# Patient Record
Sex: MALE | Race: BLACK OR AFRICAN AMERICAN | NOT HISPANIC OR LATINO | Employment: FULL TIME | URBAN - METROPOLITAN AREA
[De-identification: names, ages, dates, MRNs, and addresses within clinical notes are randomized per-mention and may not be internally consistent; named-entity substitution may affect disease eponyms.]

---

## 2018-09-04 ENCOUNTER — TRANSCRIBE ORDERS (OUTPATIENT)
Dept: ADMINISTRATIVE | Facility: HOSPITAL | Age: 38
End: 2018-09-04

## 2018-09-04 ENCOUNTER — HOSPITAL ENCOUNTER (OUTPATIENT)
Dept: RADIOLOGY | Facility: HOSPITAL | Age: 38
Discharge: HOME | End: 2018-09-04
Payer: COMMERCIAL

## 2018-09-04 DIAGNOSIS — R05.9 COUGH: Primary | ICD-10-CM

## 2018-09-04 DIAGNOSIS — R06.2 WHEEZING: ICD-10-CM

## 2018-09-04 DIAGNOSIS — R91.8 LUNG MASS: ICD-10-CM

## 2018-09-04 DIAGNOSIS — R05.9 COUGH: ICD-10-CM

## 2018-09-04 DIAGNOSIS — D84.9 IMMUNOCOMPROMISED (HCC): ICD-10-CM

## 2018-09-04 PROCEDURE — 71046 X-RAY EXAM CHEST 2 VIEWS: CPT

## 2018-10-04 ENCOUNTER — HOSPITAL ENCOUNTER (EMERGENCY)
Facility: HOSPITAL | Age: 38
Discharge: HOME | End: 2018-10-04
Attending: EMERGENCY MEDICINE | Admitting: EMERGENCY MEDICINE
Payer: COMMERCIAL

## 2018-10-04 VITALS
HEART RATE: 99 BPM | SYSTOLIC BLOOD PRESSURE: 128 MMHG | WEIGHT: 286 LBS | TEMPERATURE: 97.5 F | RESPIRATION RATE: 20 BRPM | OXYGEN SATURATION: 95 % | DIASTOLIC BLOOD PRESSURE: 88 MMHG

## 2018-10-04 DIAGNOSIS — I10 HYPERTENSION, UNSPECIFIED TYPE: ICD-10-CM

## 2018-10-04 DIAGNOSIS — J02.9 PHARYNGITIS, UNSPECIFIED ETIOLOGY: Primary | ICD-10-CM

## 2018-10-04 LAB — S PYO AG THROAT QL: NEGATIVE

## 2018-10-04 PROCEDURE — 87430 STREP A AG IA: CPT | Performed by: NURSE PRACTITIONER

## 2018-10-04 PROCEDURE — 99283 EMERGENCY DEPT VISIT LOW MDM: CPT

## 2018-10-04 PROCEDURE — 96372 THER/PROPH/DIAG INJ SC/IM: CPT

## 2018-10-04 RX ORDER — AMOXICILLIN AND CLAVULANATE POTASSIUM 875; 125 MG/1; MG/1
1 TABLET, FILM COATED ORAL EVERY 12 HOURS
Qty: 20 TABLET | Refills: 0 | Status: SHIPPED | OUTPATIENT
Start: 2018-10-04 | End: 2018-10-14

## 2018-10-04 RX ORDER — KETOROLAC TROMETHAMINE 30 MG/ML
15 INJECTION, SOLUTION INTRAMUSCULAR; INTRAVENOUS ONCE
Status: COMPLETED | OUTPATIENT
Start: 2018-10-04 | End: 2018-10-04

## 2018-10-04 RX ORDER — KETOROLAC TROMETHAMINE 10 MG/1
10 TABLET, FILM COATED ORAL EVERY 6 HOURS PRN
Qty: 20 TABLET | Refills: 0 | Status: SHIPPED | OUTPATIENT
Start: 2018-10-04 | End: 2018-12-20

## 2018-10-04 RX ORDER — KETOROLAC TROMETHAMINE 30 MG/ML
INJECTION, SOLUTION INTRAMUSCULAR; INTRAVENOUS
Status: COMPLETED
Start: 2018-10-04 | End: 2018-10-04

## 2018-10-04 RX ORDER — ACETAMINOPHEN 325 MG/1
TABLET ORAL
Status: COMPLETED
Start: 2018-10-04 | End: 2018-10-04

## 2018-10-04 RX ORDER — ACETAMINOPHEN 325 MG/1
650 TABLET ORAL ONCE
Status: COMPLETED | OUTPATIENT
Start: 2018-10-04 | End: 2018-10-04

## 2018-10-04 RX ADMIN — ACETAMINOPHEN 650 MG: 325 TABLET ORAL at 17:49

## 2018-10-04 RX ADMIN — KETOROLAC TROMETHAMINE 15 MG: 30 INJECTION, SOLUTION INTRAMUSCULAR; INTRAVENOUS at 17:50

## 2018-10-04 NOTE — DISCHARGE INSTRUCTIONS
Pharyngitis   WHAT YOU NEED TO KNOW:   Pharyngitis, or sore throat, is inflammation of the tissues and structures in your pharynx (throat)  Pharyngitis is most often caused by bacteria  It may also be caused by a cold or flu virus  Other causes include smoking, allergies, or acid reflux  DISCHARGE INSTRUCTIONS:   Call 911 for any of the following:   · You have trouble breathing or swallowing because your throat is swollen or sore  Return to the emergency department if:   · You are drooling because it hurts too much to swallow  · Your fever is higher than 102? F (39?C) or lasts longer than 3 days  · You are confused  · You taste blood in your throat  Contact your healthcare provider if:   · Your throat pain gets worse  · You have a painful lump in your throat that does not go away after 5 days  · Your symptoms do not improve after 5 days  · You have questions or concerns about your condition or care  Medicines:  Viral pharyngitis will go away on its own without treatment  Your sore throat should start to feel better in 3 to 5 days for both viral and bacterial infections  You may need any of the following:  · Antibiotics  treat a bacterial infection  · NSAIDs , such as ibuprofen, help decrease swelling, pain, and fever  NSAIDs can cause stomach bleeding or kidney problems in certain people  If you take blood thinner medicine, always ask your healthcare provider if NSAIDs are safe for you  Always read the medicine label and follow directions  · Acetaminophen  decreases pain and fever  It is available without a doctor's order  Ask how much to take and how often to take it  Follow directions  Acetaminophen can cause liver damage if not taken correctly  · Take your medicine as directed  Contact your healthcare provider if you think your medicine is not helping or if you have side effects  Tell him or her if you are allergic to any medicine   Keep a list of the medicines, vitamins, and herbs you take  Include the amounts, and when and why you take them  Bring the list or the pill bottles to follow-up visits  Carry your medicine list with you in case of an emergency  Manage your symptoms:   · Gargle salt water  Mix ¼ teaspoon salt in an 8 ounce glass of warm water and gargle  This may help decrease swelling in your throat  · Drink liquids as directed  You may need to drink more liquids than usual  Liquids may help soothe your throat and prevent dehydration  Ask how much liquid to drink each day and which liquids are best for you  · Use a cool-steam humidifier  to help moisten the air in your room and calm your cough  · Soothe your throat  with cough drops, ice, soft foods, or popsicles  Prevent the spread of pharyngitis:  Cover your mouth and nose when you cough or sneeze  Do not share food or drinks  Wash your hands often  Use soap and water  If soap and water are unavailable, use an alcohol based hand   Follow up with your healthcare provider as directed:  Write down your questions so you remember to ask them during your visits  © 2017 [facility] Information is for End User's use only and may not be sold, redistributed or otherwise used for commercial purposes  All illustrations and images included in CareNotes® are the copyrighted property of A D A M , Inc  or Pablo Bullard  The above information is an  only  It is not intended as medical advice for individual conditions or treatments  Talk to your doctor, nurse or pharmacist before following any medical regimen to see if it is safe and effective for you  Hypertension   WHAT YOU NEED TO KNOW:   Hypertension is high blood pressure (BP)  Your BP is the force of your blood moving against the walls of your arteries  Normal BP is less than 120/80  Prehypertension is between 120/80 and 139/89  Hypertension is 140/90 or higher   Hypertension causes your BP to get so high that your heart has to work much harder than normal  This can damage your heart  You can control hypertension with a healthy lifestyle or medicines  A controlled blood pressure helps protect your organs, such as your heart, lungs, brain, and kidneys  DISCHARGE INSTRUCTIONS:   Call 911 for any of the following:   · You have discomfort in your chest that feels like squeezing, pressure, fullness, or pain  · You become confused or have difficulty speaking  · You suddenly feel lightheaded or have trouble breathing  · You have pain or discomfort in your back, neck, jaw, stomach, or arm  Return to the emergency department if:   · You have a severe headache or vision loss  · You have weakness in an arm or leg  Contact your healthcare provider if:   · You feel faint, dizzy, confused, or drowsy  · You have been taking your BP medicine and your BP is still higher than your healthcare provider says it should be  · You have questions or concerns about your condition or care  Medicines: You may  need any of the following:  · Medicine  may be used to help lower your BP  You may need more than one type of medicine  Take the medicine exactly as directed  · Diuretics  help decrease extra fluid that collects in your body  This will help lower your BP  You may urinate more often while you take this medicine  · Cholesterol medicine  helps lower your cholesterol level  A low cholesterol level helps prevent heart disease and makes it easier to control your blood pressure  · Take your medicine as directed  Contact your healthcare provider if you think your medicine is not helping or if you have side effects  Tell him or her if you are allergic to any medicine  Keep a list of the medicines, vitamins, and herbs you take  Include the amounts, and when and why you take them  Bring the list or the pill bottles to follow-up visits  Carry your medicine list with you in case of an emergency    Follow up with your healthcare provider as directed: You will need to return to have your BP checked and to have other lab tests done  Write down your questions so you remember to ask them during your visits  Manage hypertension:  Talk with your healthcare provider about these and other ways to manage hypertension:  · Check your BP at home  Sit and rest for 5 minutes before you take your BP  Extend your arm and support it on a flat surface  Your arm should be at the same level as your heart  Follow the directions that came with your BP monitor  If possible, take at least 2 BP readings each time  Take your BP at least twice a day at the same times each day, such as morning and evening  Keep a record of your BP readings and bring it to your follow-up visits  Ask your healthcare provider what your BP should be  · Limit sodium (salt) as directed  Too much sodium can affect your fluid balance  Check labels to find low-sodium or no-salt-added foods  Some low-sodium foods use potassium salts for flavor  Too much potassium can also cause health problems  Your healthcare provider will tell you how much sodium and potassium are safe for you to have in a day  He or she may recommend that you limit sodium to 2,300 mg a day  · Follow the meal plan recommended by your healthcare provider  A dietitian or your provider can give you more information on low-sodium plans or the DASH (Dietary Approaches to Stop Hypertension) eating plan  The DASH plan is low in sodium, unhealthy fats, and total fat  It is high in potassium, calcium, and fiber  · Exercise to maintain a healthy weight  Exercise at least 30 minutes per day, on most days of the week  This will help decrease your blood pressure  Ask your healthcare provider about the best exercise plan for you  · Decrease stress  This may help lower your BP  Learn ways to relax, such as deep breathing or listening to music  · Limit alcohol    Women should limit alcohol to 1 drink a day  Men should limit alcohol to 2 drinks a day  A drink of alcohol is 12 ounces of beer, 5 ounces of wine, or 1½ ounces of liquor  · Do not smoke  Nicotine and other chemicals in cigarettes and cigars can increase your BP and also cause lung damage  Ask your healthcare provider for information if you currently smoke and need help to quit  E-cigarettes or smokeless tobacco still contain nicotine  Talk to your healthcare provider before you use these products  · Manage any other health conditions you have  Health conditions such as diabetes can increase your risk for hypertension  Follow your healthcare provider's instructions and take all your medicines as directed  © 2017 [facility] [facility] Information is for End User's use only and may not be sold, redistributed or otherwise used for commercial purposes  All illustrations and images included in CareNotes® are the copyrighted property of A D A [facility] [employer]  or Pablo Bullard  The above information is an  only  It is not intended as medical advice for individual conditions or treatments  Talk to your doctor, nurse or pharmacist before following any medical regimen to see if it is safe and effective for you

## 2018-10-04 NOTE — ED PROVIDER NOTES
History  Chief Complaint   Patient presents with    Jaw Pain     past 3 days have pain under right neck/jaw     Patient is a 26-year-old male presenting to the emergency department reporting right-sided neck and throat pain that he feels been getting worse over the past 2 days  He reports taking ibuprofen 400 mg with no relief of his symptoms  He reports some pain with swallowing but no difficulty in swallowing  No difficulty speaking  He denies any trouble breathing  Denies any chest pain or tightness  Denies any abdominal pain, nausea, vomiting, or diarrhea  He reports no fevers or chills  Otherwise, reporting no other symptoms  Denies any other concerns  None       Past Medical History:   Diagnosis Date    Asthma        History reviewed  No pertinent surgical history  History reviewed  No pertinent family history  I have reviewed and agree with the history as documented  Social History   Substance Use Topics    Smoking status: Current Every Day Smoker    Smokeless tobacco: Never Used    Alcohol use No        Review of Systems   Constitutional: Negative for chills, fatigue and fever  HENT: Positive for sore throat  Negative for congestion, ear pain, facial swelling, postnasal drip, rhinorrhea, sinus pain, sinus pressure, trouble swallowing (  Pain with swallowing ) and voice change  Eyes: Negative for pain, discharge and redness  Respiratory: Negative for chest tightness, shortness of breath and stridor  Cardiovascular: Negative for chest pain and palpitations  Gastrointestinal: Negative for abdominal pain, diarrhea, nausea and vomiting  Musculoskeletal: Positive for neck pain (Right anterior  )  Negative for arthralgias, back pain, joint swelling and neck stiffness  Skin: Negative for rash and wound  Neurological: Negative for dizziness, light-headedness and headaches  Hematological: Negative for adenopathy         Physical Exam  Physical Exam   Constitutional: He is oriented to person, place, and time  He appears well-developed and well-nourished  Non-toxic appearance  He does not have a sickly appearance  He does not appear ill  No distress  HENT:   Right Ear: Tympanic membrane normal    Left Ear: Tympanic membrane normal    Nose: Nose normal    Mouth/Throat: Uvula is midline  Mucous membranes are not pale, dry and not cyanotic  No trismus in the jaw  No uvula swelling  Posterior oropharyngeal erythema present  No oropharyngeal exudate, posterior oropharyngeal edema or tonsillar abscesses  Tonsils are 2+ on the right  Tonsils are 1+ on the left  No tonsillar exudate  Mildly erythematous canals  No swelling is appreciated  TMs mildly injected, otherwise normal in appearance  Patient reports no ear pain or discomfort  Eyes: Conjunctivae are normal    Neck: Neck supple  Cardiovascular: Normal rate and regular rhythm  Pulmonary/Chest: Effort normal and breath sounds normal  No respiratory distress  Abdominal: There is no tenderness  Neurological: He is alert and oriented to person, place, and time  Skin: Skin is warm and dry  Psychiatric: He has a normal mood and affect  Nursing note and vitals reviewed        Vital Signs  ED Triage Vitals [10/04/18 1708]   Temperature Pulse Respirations Blood Pressure SpO2   97 5 °F (36 4 °C) (!) 107 20 (!) 169/117 96 %      Temp Source Heart Rate Source Patient Position - Orthostatic VS BP Location FiO2 (%)   Tymp Core Monitor Sitting Left arm --      Pain Score       9           Vitals:    10/04/18 1708   BP: (!) 169/117   Pulse: (!) 107   Patient Position - Orthostatic VS: Sitting       Visual Acuity      ED Medications  Medications   ketorolac (TORADOL) injection 15 mg (15 mg Intramuscular Given 10/4/18 1750)   acetaminophen (TYLENOL) tablet 650 mg (650 mg Oral Given 10/4/18 1749)       Diagnostic Studies  Results Reviewed     Procedure Component Value Units Date/Time    Rapid Strep A Screen Only, Adults [[id number]]  (Normal) Collected:  10/04/18 1749    Lab Status:  Final result Specimen:  Throat from Throat Updated:  10/04/18 1807     Rapid Strep A Screen Negative                 No orders to display              Procedures  Procedures       Phone Contacts  ED Phone Contact    ED Course                               MDM  Number of Diagnoses or Management Options  Hypertension, unspecified type:   Pharyngitis, unspecified etiology: new and requires workup  Diagnosis management comments: Strep test is negative  Patient will be treated prophylactically with Augmentin b i d  times 10 days due to his feeling of throat swelling and throat pain  Patient also given Toradol p o  to take as needed for pain  He was also advised to follow up with his primary provider for recheck and also for evaluation of hypertension  Patient is denying any complaint of chest pain, headache, dizziness or lightheadedness, abdominal pain, nausea vomiting or diarrhea  Patient stable for discharge at this time  Advised to return to the ED with any worsening symptoms or emergent concerns  Amount and/or Complexity of Data Reviewed  Clinical lab tests: reviewed and ordered    Patient Progress  Patient progress: stable    CritCare Time    Disposition  Final diagnoses:   Pharyngitis, unspecified etiology   Hypertension, unspecified type     Time reflects when diagnosis was documented in both MDM as applicable and the Disposition within this note     Time User Action Codes Description Comment    10/4/2018  6:39 PM Jess Decker [J02 9] Pharyngitis, unspecified etiology     10/4/2018  6:39 PM Jess Decker [I10] Hypertension, unspecified type       ED Disposition     ED Disposition Condition Comment    Discharge  [place] discharge to home/self care      Condition at discharge: Stable        Follow-up Information     Follow up With Specialties Details Why Contact Louisa Gordon  In 3 days To recheck year sore throat and also to be evaluated for high blood pressure, as discussed  [address] [facility] [address] [address] [facility] Emergency Department Emergency Medicine  As needed, If symptoms worsen [address] [address] [phone]          Patient's Medications   Discharge Prescriptions    AMOXICILLIN-CLAVULANATE (AUGMENTIN) 875-125 MG PER TABLET    Take 1 tablet by mouth every 12 (twelve) hours for 10 days       Start Date: 10/4/2018 End Date: 10/14/2018       Order Dose: 1 tablet       Quantity: 20 tablet    Refills: 0    KETOROLAC (TORADOL) 10 MG TABLET    Take 1 tablet (10 mg total) by mouth every 6 (six) hours as needed for moderate pain for up to 5 days       Start Date: 10/4/2018 End Date: 10/9/2018       Order Dose: 10 mg       Quantity: 20 tablet    Refills: 0     No discharge procedures on file      ED Provider  Electronically Signed by           MUNDO Garcia  10/04/18 [address]  [facility]  10/04/18 6319

## 2018-12-20 ENCOUNTER — HOSPITAL ENCOUNTER (EMERGENCY)
Facility: HOSPITAL | Age: 38
Discharge: HOME | End: 2018-12-20
Attending: EMERGENCY MEDICINE | Admitting: EMERGENCY MEDICINE
Payer: COMMERCIAL

## 2018-12-20 ENCOUNTER — APPOINTMENT (EMERGENCY)
Dept: RADIOLOGY | Facility: HOSPITAL | Age: 38
End: 2018-12-20
Payer: COMMERCIAL

## 2018-12-20 VITALS
TEMPERATURE: 98.1 F | HEART RATE: 99 BPM | OXYGEN SATURATION: 96 % | SYSTOLIC BLOOD PRESSURE: 132 MMHG | DIASTOLIC BLOOD PRESSURE: 72 MMHG | RESPIRATION RATE: 20 BRPM | WEIGHT: 257.28 LBS

## 2018-12-20 DIAGNOSIS — J45.901 ASTHMA EXACERBATION: Primary | ICD-10-CM

## 2018-12-20 LAB
ANION GAP SERPL CALCULATED.3IONS-SCNC: 8 MMOL/L (ref 4–13)
BASOPHILS # BLD AUTO: 0.04 THOUSANDS/ΜL (ref 0–0.1)
BASOPHILS NFR BLD AUTO: 1 % (ref 0–1)
BUN SERPL-MCNC: 10 MG/DL (ref 5–25)
CALCIUM SERPL-MCNC: 9.3 MG/DL (ref 8.3–10.1)
CHLORIDE SERPL-SCNC: 104 MMOL/L (ref 100–108)
CO2 SERPL-SCNC: 26 MMOL/L (ref 21–32)
CREAT SERPL-MCNC: 1.17 MG/DL (ref 0.6–1.3)
EOSINOPHIL # BLD AUTO: 0.33 THOUSAND/ΜL (ref 0–0.61)
EOSINOPHIL NFR BLD AUTO: 5 % (ref 0–6)
ERYTHROCYTE [DISTWIDTH] IN BLOOD BY AUTOMATED COUNT: 14.8 % (ref 11.6–15.1)
GFR SERPL CREATININE-BSD FRML MDRD: 90 ML/MIN/1.73SQ M
GLUCOSE SERPL-MCNC: 104 MG/DL (ref 65–140)
HCT VFR BLD AUTO: 49.8 % (ref 36.5–49.3)
HGB BLD-MCNC: 15.7 G/DL (ref 12–17)
IMM GRANULOCYTES # BLD AUTO: 0.02 THOUSAND/UL (ref 0–0.2)
IMM GRANULOCYTES NFR BLD AUTO: 0 % (ref 0–2)
LYMPHOCYTES # BLD AUTO: 1.2 THOUSANDS/ΜL (ref 0.6–4.47)
LYMPHOCYTES NFR BLD AUTO: 18 % (ref 14–44)
MCH RBC QN AUTO: 26.9 PG (ref 26.8–34.3)
MCHC RBC AUTO-ENTMCNC: 31.5 G/DL (ref 31.4–37.4)
MCV RBC AUTO: 85 FL (ref 82–98)
MONOCYTES # BLD AUTO: 0.72 THOUSAND/ΜL (ref 0.17–1.22)
MONOCYTES NFR BLD AUTO: 11 % (ref 4–12)
NEUTROPHILS # BLD AUTO: 4.21 THOUSANDS/ΜL (ref 1.85–7.62)
NEUTS SEG NFR BLD AUTO: 65 % (ref 43–75)
NRBC BLD AUTO-RTO: 0 /100 WBCS
PLATELET # BLD AUTO: 262 THOUSANDS/UL (ref 149–390)
PMV BLD AUTO: 9 FL (ref 8.9–12.7)
POTASSIUM SERPL-SCNC: 4.1 MMOL/L (ref 3.5–5.3)
RBC # BLD AUTO: 5.83 MILLION/UL (ref 3.88–5.62)
SODIUM SERPL-SCNC: 138 MMOL/L (ref 136–145)
WBC # BLD AUTO: 6.52 THOUSAND/UL (ref 4.31–10.16)

## 2018-12-20 PROCEDURE — 71046 X-RAY EXAM CHEST 2 VIEWS: CPT

## 2018-12-20 PROCEDURE — 36415 COLL VENOUS BLD VENIPUNCTURE: CPT | Performed by: FAMILY MEDICINE

## 2018-12-20 PROCEDURE — 99285 EMERGENCY DEPT VISIT HI MDM: CPT

## 2018-12-20 PROCEDURE — 85025 COMPLETE CBC W/AUTO DIFF WBC: CPT | Performed by: FAMILY MEDICINE

## 2018-12-20 PROCEDURE — 80048 BASIC METABOLIC PNL TOTAL CA: CPT | Performed by: FAMILY MEDICINE

## 2018-12-20 PROCEDURE — 96374 THER/PROPH/DIAG INJ IV PUSH: CPT

## 2018-12-20 PROCEDURE — 94640 AIRWAY INHALATION TREATMENT: CPT

## 2018-12-20 RX ORDER — PREDNISONE 20 MG/1
60 TABLET ORAL DAILY
Qty: 15 TABLET | Refills: 0 | Status: SHIPPED | OUTPATIENT
Start: 2018-12-20 | End: 2018-12-24 | Stop reason: HOSPADM

## 2018-12-20 RX ORDER — IPRATROPIUM BROMIDE AND ALBUTEROL SULFATE 2.5; .5 MG/3ML; MG/3ML
3 SOLUTION RESPIRATORY (INHALATION) ONCE
Status: COMPLETED | OUTPATIENT
Start: 2018-12-20 | End: 2018-12-20

## 2018-12-20 RX ORDER — SODIUM CHLORIDE FOR INHALATION 0.9 %
12 VIAL, NEBULIZER (ML) INHALATION ONCE
Status: COMPLETED | OUTPATIENT
Start: 2018-12-20 | End: 2018-12-20

## 2018-12-20 RX ORDER — METHYLPREDNISOLONE SODIUM SUCCINATE 125 MG/2ML
125 INJECTION, POWDER, LYOPHILIZED, FOR SOLUTION INTRAMUSCULAR; INTRAVENOUS ONCE
Status: COMPLETED | OUTPATIENT
Start: 2018-12-20 | End: 2018-12-20

## 2018-12-20 RX ORDER — ALBUTEROL SULFATE 90 UG/1
2 AEROSOL, METERED RESPIRATORY (INHALATION) EVERY 4 HOURS PRN
Qty: 8.5 G | Refills: 0 | Status: SHIPPED | OUTPATIENT
Start: 2018-12-20

## 2018-12-20 RX ADMIN — ISODIUM CHLORIDE 12 ML: 0.03 SOLUTION RESPIRATORY (INHALATION) at 11:58

## 2018-12-20 RX ADMIN — ALBUTEROL SULFATE 10 MG: 2.5 SOLUTION RESPIRATORY (INHALATION) at 11:57

## 2018-12-20 RX ADMIN — METHYLPREDNISOLONE SODIUM SUCCINATE 125 MG: 125 INJECTION, POWDER, FOR SOLUTION INTRAMUSCULAR; INTRAVENOUS at 10:03

## 2018-12-20 RX ADMIN — IPRATROPIUM BROMIDE AND ALBUTEROL SULFATE 3 ML: 2.5; .5 SOLUTION RESPIRATORY (INHALATION) at 10:03

## 2018-12-20 RX ADMIN — IPRATROPIUM BROMIDE 1 MG: 0.5 SOLUTION RESPIRATORY (INHALATION) at 11:58

## 2018-12-20 NOTE — DISCHARGE INSTRUCTIONS
Asthma   WHAT YOU NEED TO KNOW:   Asthma is a lung disease that makes breathing difficult  Chronic inflammation and reactions to triggers narrow the airways in the lungs  Asthma can become life-threatening if it is not managed  DISCHARGE INSTRUCTIONS:   Seek care immediately if:   · You have severe shortness of breath  · Your lips or nails turn blue or gray  · The skin around your neck and ribs pulls in with each breath  · You have shortness of breath, even after you take your short-term medicine as directed  · Your peak flow numbers are in the red zone of your AAP  Contact your healthcare provider if:   · You run out of medicine before your next refill is due  · Your symptoms get worse  · You need to take more medicine than usual to control your symptoms  · You have questions or concerns about your condition or care  Medicines:   · Medicines  decrease inflammation, open airways, and make it easier to breathe  Medicines may be inhaled, taken as a pill, or injected  Short-term medicines relieve your symptoms quickly  Long-term medicines are used to prevent future attacks  You may also need medicine to help control your allergies  Ask your healthcare provider for more information about the medicine you are given and how to take it safely  · Take your medicine as directed  Contact your healthcare provider if you think your medicine is not helping or if you have side effects  Tell him or her if you are allergic to any medicine  Keep a list of the medicines, vitamins, and herbs you take  Include the amounts, and when and why you take them  Bring the list or the pill bottles to follow-up visits  Carry your medicine list with you in case of an emergency  Follow up with your healthcare provider as directed: You will need to return to make sure your medicine is working and your symptoms are controlled   You may be referred to an asthma specialist  Kayleigh Rao may be asked to keep a record of your peak flow values and bring it with you to your appointments  Write down your questions so you remember to ask them  Manage your symptoms and prevent future attacks:   · Follow your Asthma Action Plan (AAP)  This is a written plan that you and your healthcare provider create  It explains which medicine you need and when to change doses if necessary  It also explains how you can monitor symptoms and use a peak flow meter  The meter measures how well your lungs are working  · Manage other health conditions , such as allergies, acid reflux, and sleep apnea  · Identify and avoid triggers  These may include pets, dust mites, mold, and cockroaches  · Do not smoke or be around others who smoke  Nicotine and other chemicals in cigarettes and cigars can cause lung damage  Ask your healthcare provider for information if you currently smoke and need help to quit  E-cigarettes or smokeless tobacco still contain nicotine  Talk to your healthcare provider before you use these products  · Ask about the flu vaccine  The flu can make your asthma worse  You may need a yearly flu shot  © 2017 [facility] [facility] Information is for End User's use only and may not be sold, redistributed or otherwise used for commercial purposes  All illustrations and images included in CareNotes® are the copyrighted property of [employer] Inc  or Pablo Bullard  The above information is an  only  It is not intended as medical advice for individual conditions or treatments  Talk to your doctor, nurse or pharmacist before following any medical regimen to see if it is safe and effective for you

## 2018-12-20 NOTE — ED PROVIDER NOTES
History  Chief Complaint   Patient presents with    Shortness of Breath     sob, wheezing, congestion, cough, sore throat x 2 days  Hx of asthma  Using inhaler, last use 2 hours ago  HPI  53-year-old here with shortness of breath  Patient has a past medical history of HIV compliant with his HIV meds and asthma on albuterol inhaler p r n  and Qvar   He reported 2 day history of runny nose, cough, sore throat  He reports he woke up at night feeling short of breath  He used his albuterol inhaler 3 times overnight with no improvement  He reports chills and nausea  He denies fever, vomiting, diarrhea, abdominal pain and ill contact  Prior to Admission Medications   Prescriptions Last Dose Informant Patient Reported? Taking? Zidovudine (RETROVIR PO)   Yes Yes   Sig: Take by mouth daily      Facility-Administered Medications: None       Past Medical History:   Diagnosis Date    Asthma     HIV (human immunodeficiency virus infection) ([facility] [address] )        History reviewed  No pertinent surgical history  History reviewed  No pertinent family history  I have reviewed and agree with the history as documented  Social History   Substance Use Topics    Smoking status: Current Every Day Smoker    Smokeless tobacco: Never Used    Alcohol use No        Review of Systems   Constitutional: Positive for chills  Negative for fever  HENT: Positive for congestion, rhinorrhea and sore throat  Respiratory: Positive for cough, shortness of breath and wheezing  Cardiovascular: Negative for chest pain  Gastrointestinal: Positive for nausea  Negative for abdominal pain, diarrhea and vomiting  Neurological: Negative for headaches         Physical Exam  ED Triage Vitals [12/20/18 0928]   Temperature Pulse Respirations Blood Pressure SpO2   98 1 °F (36 7 °C) 105 (!) 24 150/94 97 %      Temp Source Heart Rate Source Patient Position - Orthostatic VS BP Location FiO2 (%)   Oral -- -- -- --      Pain Score       7 Orthostatic Vital Signs  Vitals:    12/20/18 0928   BP: 150/94   Pulse: 105       Physical Exam   Constitutional: He is oriented to person, place, and time  He appears well-developed and well-nourished  HENT:   Head: Normocephalic and atraumatic  Eyes: EOM are normal    Neck: Normal range of motion  Cardiovascular: Normal rate, regular rhythm and normal heart sounds  Pulmonary/Chest: He has wheezes  Abdominal: Soft  Bowel sounds are normal    Neurological: He is alert and oriented to person, place, and time  Skin: Skin is warm and dry  Vitals reviewed  ED Medications  Medications   ipratropium-albuterol (DUO-NEB) 0 5-2 5 mg/3 mL inhalation solution 3 mL (not administered)   methylPREDNISolone sodium succinate (Solu-MEDROL) injection 125 mg (not administered)       Diagnostic Studies  Results Reviewed     Procedure Component Value Units Date/Time    CBC and differential [[id number]]     Lab Status:  No result Specimen:  Blood     Basic metabolic panel [[id number]]     Lab Status:  No result Specimen:  Blood                  No orders to display         Procedures  Procedures      Phone Consults  ED Phone Contact    ED Course                               [facility]  [facility]    Disposition  Final diagnoses:   None     ED Disposition     None      Follow-up Information    None         Patient's Medications   Discharge Prescriptions    No medications on file     No discharge procedures on file  ED Provider  Attending physically available and evaluated [facility]  I managed the patient along with the ED Attending      Electronically Signed by         Suresh Fenton MD  12/21/18 2356

## 2018-12-22 ENCOUNTER — APPOINTMENT (EMERGENCY)
Dept: RADIOLOGY | Facility: HOSPITAL | Age: 38
DRG: 720 | End: 2018-12-22
Payer: COMMERCIAL

## 2018-12-22 ENCOUNTER — HOSPITAL ENCOUNTER (INPATIENT)
Facility: HOSPITAL | Age: 38
LOS: 2 days | Discharge: HOME | DRG: 720 | End: 2018-12-24
Attending: EMERGENCY MEDICINE | Admitting: INTERNAL MEDICINE
Payer: COMMERCIAL

## 2018-12-22 DIAGNOSIS — J21.0 RESPIRATORY SYNCYTIAL VIRUS (RSV) BRONCHIOLITIS: ICD-10-CM

## 2018-12-22 DIAGNOSIS — J45.51 SEVERE PERSISTENT ASTHMA WITH EXACERBATION: ICD-10-CM

## 2018-12-22 DIAGNOSIS — J45.901 ASTHMA EXACERBATION: Primary | ICD-10-CM

## 2018-12-22 DIAGNOSIS — B20 HIV (HUMAN IMMUNODEFICIENCY VIRUS INFECTION) (HCC): ICD-10-CM

## 2018-12-22 DIAGNOSIS — Z72.0 TOBACCO USE: ICD-10-CM

## 2018-12-22 DIAGNOSIS — A41.9 SEPSIS (HCC): ICD-10-CM

## 2018-12-22 LAB
ANION GAP SERPL CALCULATED.3IONS-SCNC: 11 MMOL/L (ref 4–13)
BASOPHILS # BLD AUTO: 0.01 THOUSANDS/ΜL (ref 0–0.1)
BASOPHILS NFR BLD AUTO: 0 % (ref 0–1)
BUN SERPL-MCNC: 15 MG/DL (ref 5–25)
CALCIUM SERPL-MCNC: 9.7 MG/DL (ref 8.3–10.1)
CHLORIDE SERPL-SCNC: 103 MMOL/L (ref 100–108)
CO2 SERPL-SCNC: 25 MMOL/L (ref 21–32)
CREAT SERPL-MCNC: 1.22 MG/DL (ref 0.6–1.3)
EOSINOPHIL # BLD AUTO: 0.01 THOUSAND/ΜL (ref 0–0.61)
EOSINOPHIL NFR BLD AUTO: 0 % (ref 0–6)
ERYTHROCYTE [DISTWIDTH] IN BLOOD BY AUTOMATED COUNT: 15.1 % (ref 11.6–15.1)
GFR SERPL CREATININE-BSD FRML MDRD: 85 ML/MIN/1.73SQ M
GLUCOSE SERPL-MCNC: 183 MG/DL (ref 65–140)
HCT VFR BLD AUTO: 50.4 % (ref 36.5–49.3)
HGB BLD-MCNC: 16 G/DL (ref 12–17)
IMM GRANULOCYTES # BLD AUTO: 0.09 THOUSAND/UL (ref 0–0.2)
IMM GRANULOCYTES NFR BLD AUTO: 1 % (ref 0–2)
LACTATE SERPL-SCNC: 1.8 MMOL/L (ref 0.5–2)
LACTATE SERPL-SCNC: 2.2 MMOL/L (ref 0.5–2)
LYMPHOCYTES # BLD AUTO: 1.35 THOUSANDS/ΜL (ref 0.6–4.47)
LYMPHOCYTES NFR BLD AUTO: 14 % (ref 14–44)
MCH RBC QN AUTO: 27.1 PG (ref 26.8–34.3)
MCHC RBC AUTO-ENTMCNC: 31.7 G/DL (ref 31.4–37.4)
MCV RBC AUTO: 85 FL (ref 82–98)
MONOCYTES # BLD AUTO: 0.65 THOUSAND/ΜL (ref 0.17–1.22)
MONOCYTES NFR BLD AUTO: 7 % (ref 4–12)
NEUTROPHILS # BLD AUTO: 7.27 THOUSANDS/ΜL (ref 1.85–7.62)
NEUTS SEG NFR BLD AUTO: 78 % (ref 43–75)
NRBC BLD AUTO-RTO: 0 /100 WBCS
PLATELET # BLD AUTO: 258 THOUSANDS/UL (ref 149–390)
PMV BLD AUTO: 9.1 FL (ref 8.9–12.7)
POTASSIUM SERPL-SCNC: 3.9 MMOL/L (ref 3.5–5.3)
PROCALCITONIN SERPL-MCNC: <0.05 NG/ML
RBC # BLD AUTO: 5.9 MILLION/UL (ref 3.88–5.62)
SODIUM SERPL-SCNC: 139 MMOL/L (ref 136–145)
WBC # BLD AUTO: 9.38 THOUSAND/UL (ref 4.31–10.16)

## 2018-12-22 PROCEDURE — 80048 BASIC METABOLIC PNL TOTAL CA: CPT | Performed by: EMERGENCY MEDICINE

## 2018-12-22 PROCEDURE — 86360 T CELL ABSOLUTE COUNT/RATIO: CPT | Performed by: EMERGENCY MEDICINE

## 2018-12-22 PROCEDURE — 99223 1ST HOSP IP/OBS HIGH 75: CPT | Performed by: INTERNAL MEDICINE

## 2018-12-22 PROCEDURE — 94760 N-INVAS EAR/PLS OXIMETRY 1: CPT

## 2018-12-22 PROCEDURE — 94644 CONT INHLJ TX 1ST HOUR: CPT

## 2018-12-22 PROCEDURE — 94640 AIRWAY INHALATION TREATMENT: CPT

## 2018-12-22 PROCEDURE — 87536 HIV-1 QUANT&REVRSE TRNSCRPJ: CPT | Performed by: EMERGENCY MEDICINE

## 2018-12-22 PROCEDURE — 96375 TX/PRO/DX INJ NEW DRUG ADDON: CPT

## 2018-12-22 PROCEDURE — 99254 IP/OBS CNSLTJ NEW/EST MOD 60: CPT | Performed by: INTERNAL MEDICINE

## 2018-12-22 PROCEDURE — 36415 COLL VENOUS BLD VENIPUNCTURE: CPT | Performed by: EMERGENCY MEDICINE

## 2018-12-22 PROCEDURE — 83605 ASSAY OF LACTIC ACID: CPT | Performed by: EMERGENCY MEDICINE

## 2018-12-22 PROCEDURE — 85025 COMPLETE CBC W/AUTO DIFF WBC: CPT | Performed by: EMERGENCY MEDICINE

## 2018-12-22 PROCEDURE — 96365 THER/PROPH/DIAG IV INF INIT: CPT

## 2018-12-22 PROCEDURE — 87631 RESP VIRUS 3-5 TARGETS: CPT | Performed by: INTERNAL MEDICINE

## 2018-12-22 PROCEDURE — 96361 HYDRATE IV INFUSION ADD-ON: CPT

## 2018-12-22 PROCEDURE — 99284 EMERGENCY DEPT VISIT MOD MDM: CPT

## 2018-12-22 PROCEDURE — 71046 X-RAY EXAM CHEST 2 VIEWS: CPT

## 2018-12-22 PROCEDURE — 87040 BLOOD CULTURE FOR BACTERIA: CPT | Performed by: EMERGENCY MEDICINE

## 2018-12-22 PROCEDURE — 84145 PROCALCITONIN (PCT): CPT | Performed by: PHYSICIAN ASSISTANT

## 2018-12-22 RX ORDER — LEVOFLOXACIN 5 MG/ML
750 INJECTION, SOLUTION INTRAVENOUS EVERY 24 HOURS
Status: DISCONTINUED | OUTPATIENT
Start: 2018-12-23 | End: 2018-12-24 | Stop reason: HOSPADM

## 2018-12-22 RX ORDER — KETOROLAC TROMETHAMINE 30 MG/ML
30 INJECTION, SOLUTION INTRAMUSCULAR; INTRAVENOUS EVERY 6 HOURS SCHEDULED
Status: DISCONTINUED | OUTPATIENT
Start: 2018-12-22 | End: 2018-12-24 | Stop reason: HOSPADM

## 2018-12-22 RX ORDER — CALCIUM CARBONATE 200(500)MG
1000 TABLET,CHEWABLE ORAL DAILY PRN
Status: DISCONTINUED | OUTPATIENT
Start: 2018-12-22 | End: 2018-12-24 | Stop reason: HOSPADM

## 2018-12-22 RX ORDER — METHYLPREDNISOLONE SODIUM SUCCINATE 125 MG/2ML
125 INJECTION, POWDER, LYOPHILIZED, FOR SOLUTION INTRAMUSCULAR; INTRAVENOUS EVERY 12 HOURS SCHEDULED
Status: DISCONTINUED | OUTPATIENT
Start: 2018-12-23 | End: 2018-12-22

## 2018-12-22 RX ORDER — LEVOFLOXACIN 5 MG/ML
750 INJECTION, SOLUTION INTRAVENOUS ONCE
Status: COMPLETED | OUTPATIENT
Start: 2018-12-22 | End: 2018-12-22

## 2018-12-22 RX ORDER — DIPHENHYDRAMINE HYDROCHLORIDE 50 MG/ML
25 INJECTION INTRAMUSCULAR; INTRAVENOUS EVERY 6 HOURS SCHEDULED
Status: DISCONTINUED | OUTPATIENT
Start: 2018-12-22 | End: 2018-12-24 | Stop reason: HOSPADM

## 2018-12-22 RX ORDER — MAGNESIUM SULFATE HEPTAHYDRATE 40 MG/ML
2 INJECTION, SOLUTION INTRAVENOUS ONCE
Status: COMPLETED | OUTPATIENT
Start: 2018-12-22 | End: 2018-12-22

## 2018-12-22 RX ORDER — SODIUM CHLORIDE FOR INHALATION 0.9 %
3 VIAL, NEBULIZER (ML) INHALATION ONCE
Status: COMPLETED | OUTPATIENT
Start: 2018-12-22 | End: 2018-12-22

## 2018-12-22 RX ORDER — METHYLPREDNISOLONE SODIUM SUCCINATE 40 MG/ML
40 INJECTION, POWDER, LYOPHILIZED, FOR SOLUTION INTRAMUSCULAR; INTRAVENOUS EVERY 8 HOURS SCHEDULED
Status: COMPLETED | OUTPATIENT
Start: 2018-12-22 | End: 2018-12-23

## 2018-12-22 RX ORDER — ALBUTEROL SULFATE 90 UG/1
2 AEROSOL, METERED RESPIRATORY (INHALATION) EVERY 2 HOUR PRN
Status: DISCONTINUED | OUTPATIENT
Start: 2018-12-22 | End: 2018-12-24 | Stop reason: HOSPADM

## 2018-12-22 RX ORDER — IPRATROPIUM BROMIDE AND ALBUTEROL SULFATE 2.5; .5 MG/3ML; MG/3ML
3 SOLUTION RESPIRATORY (INHALATION)
Status: DISCONTINUED | OUTPATIENT
Start: 2018-12-22 | End: 2018-12-24 | Stop reason: HOSPADM

## 2018-12-22 RX ORDER — METOCLOPRAMIDE HYDROCHLORIDE 5 MG/ML
10 INJECTION INTRAMUSCULAR; INTRAVENOUS EVERY 6 HOURS SCHEDULED
Status: DISCONTINUED | OUTPATIENT
Start: 2018-12-22 | End: 2018-12-24 | Stop reason: HOSPADM

## 2018-12-22 RX ORDER — ACETAMINOPHEN 325 MG/1
650 TABLET ORAL EVERY 6 HOURS PRN
Status: DISCONTINUED | OUTPATIENT
Start: 2018-12-22 | End: 2018-12-22

## 2018-12-22 RX ORDER — METHYLPREDNISOLONE SODIUM SUCCINATE 125 MG/2ML
125 INJECTION, POWDER, LYOPHILIZED, FOR SOLUTION INTRAMUSCULAR; INTRAVENOUS ONCE
Status: COMPLETED | OUTPATIENT
Start: 2018-12-22 | End: 2018-12-22

## 2018-12-22 RX ORDER — ACETAMINOPHEN 325 MG/1
650 TABLET ORAL EVERY 6 HOURS PRN
Status: DISCONTINUED | OUTPATIENT
Start: 2018-12-22 | End: 2018-12-24 | Stop reason: HOSPADM

## 2018-12-22 RX ORDER — LEVALBUTEROL 1.25 MG/.5ML
1.25 SOLUTION, CONCENTRATE RESPIRATORY (INHALATION)
Status: DISCONTINUED | OUTPATIENT
Start: 2018-12-22 | End: 2018-12-22

## 2018-12-22 RX ORDER — BENZONATATE 100 MG/1
100 CAPSULE ORAL 3 TIMES DAILY
Status: DISCONTINUED | OUTPATIENT
Start: 2018-12-22 | End: 2018-12-24 | Stop reason: HOSPADM

## 2018-12-22 RX ORDER — SODIUM CHLORIDE FOR INHALATION 0.9 %
3 VIAL, NEBULIZER (ML) INHALATION
Status: DISCONTINUED | OUTPATIENT
Start: 2018-12-22 | End: 2018-12-22

## 2018-12-22 RX ADMIN — LEVOFLOXACIN 750 MG: 5 INJECTION, SOLUTION INTRAVENOUS at 12:36

## 2018-12-22 RX ADMIN — DIPHENHYDRAMINE HYDROCHLORIDE 25 MG: 50 INJECTION, SOLUTION INTRAMUSCULAR; INTRAVENOUS at 18:39

## 2018-12-22 RX ADMIN — ISODIUM CHLORIDE 3 ML: 0.03 SOLUTION RESPIRATORY (INHALATION) at 10:22

## 2018-12-22 RX ADMIN — MAGNESIUM SULFATE IN WATER 2 G: 40 INJECTION, SOLUTION INTRAVENOUS at 10:39

## 2018-12-22 RX ADMIN — KETOROLAC TROMETHAMINE 30 MG: 30 INJECTION, SOLUTION INTRAMUSCULAR at 23:59

## 2018-12-22 RX ADMIN — METOCLOPRAMIDE HYDROCHLORIDE 10 MG: 5 INJECTION INTRAMUSCULAR; INTRAVENOUS at 18:38

## 2018-12-22 RX ADMIN — NICOTINE 1 PATCH: 7 PATCH, EXTENDED RELEASE TRANSDERMAL at 15:59

## 2018-12-22 RX ADMIN — METHYLPREDNISOLONE SODIUM SUCCINATE 40 MG: 40 INJECTION, POWDER, FOR SOLUTION INTRAMUSCULAR; INTRAVENOUS at 18:40

## 2018-12-22 RX ADMIN — SODIUM CHLORIDE 1000 ML: 0.9 INJECTION, SOLUTION INTRAVENOUS at 10:35

## 2018-12-22 RX ADMIN — KETOROLAC TROMETHAMINE 30 MG: 30 INJECTION, SOLUTION INTRAMUSCULAR at 18:38

## 2018-12-22 RX ADMIN — IPRATROPIUM BROMIDE 0.5 MG: 0.5 SOLUTION RESPIRATORY (INHALATION) at 16:17

## 2018-12-22 RX ADMIN — BENZONATATE 100 MG: 100 CAPSULE ORAL at 22:09

## 2018-12-22 RX ADMIN — METHYLPREDNISOLONE SODIUM SUCCINATE 125 MG: 125 INJECTION, POWDER, FOR SOLUTION INTRAMUSCULAR; INTRAVENOUS at 10:36

## 2018-12-22 RX ADMIN — DIPHENHYDRAMINE HYDROCHLORIDE 25 MG: 50 INJECTION, SOLUTION INTRAMUSCULAR; INTRAVENOUS at 23:58

## 2018-12-22 RX ADMIN — ALBUTEROL SULFATE 10 MG: 2.5 SOLUTION RESPIRATORY (INHALATION) at 10:22

## 2018-12-22 RX ADMIN — IPRATROPIUM BROMIDE 1 MG: 0.5 SOLUTION RESPIRATORY (INHALATION) at 10:22

## 2018-12-22 RX ADMIN — SODIUM CHLORIDE 1000 ML: 0.9 INJECTION, SOLUTION INTRAVENOUS at 11:54

## 2018-12-22 RX ADMIN — ENOXAPARIN SODIUM 40 MG: 40 INJECTION SUBCUTANEOUS at 16:00

## 2018-12-22 RX ADMIN — BENZONATATE 100 MG: 100 CAPSULE ORAL at 15:59

## 2018-12-22 RX ADMIN — IPRATROPIUM BROMIDE AND ALBUTEROL SULFATE 3 ML: 2.5; .5 SOLUTION RESPIRATORY (INHALATION) at 21:59

## 2018-12-22 RX ADMIN — LEVALBUTEROL 1.25 MG: 1.25 SOLUTION, CONCENTRATE RESPIRATORY (INHALATION) at 16:17

## 2018-12-22 RX ADMIN — ACETAMINOPHEN 650 MG: 325 TABLET, FILM COATED ORAL at 17:01

## 2018-12-22 NOTE — LETTER
[address]  Dept: [phone]    December 24, 2018     Patient: Fern Leger   YOB: 1980   Date of Visit: 12/22/2018       To Whom it May Concern:    Fern Leger is under my professional care  He was seen in the hospital from 12/22/2018   to 12/24/18  He may return to work on 12/27/2018  If you have any questions or concerns, please don't hesitate to call           Sincerely,          Sharyle Crazier, MD

## 2018-12-22 NOTE — ASSESSMENT & PLAN NOTE
Followed by ID in [employer]  Just started on Ada has been compliant w/meds  Reports 2 mo ago cd4 was [id number], unable to recall viral load  F/u cd4 and viral load draw, if found to have AIDS consider ID consultation and possible IRS

## 2018-12-22 NOTE — ASSESSMENT & PLAN NOTE
2* URI w/asthma exacerbation vs bronchitis  In pt w/HIV  By tachycardia tachypnea with initial lactic acid 2 2 now 1 8  CXR negative for pneumonia  Received Levaquin in the ED Will continue for now check procalcitonin as well as influenza by pcr  Follow-up blood cultures monitor vital signs for fever curve

## 2018-12-22 NOTE — ED PROVIDER NOTES
History  Chief Complaint   Patient presents with    Asthma     Reports was evaluated at this facility on Thursday for same  States given steroids  Reports taking as directed  Reports started having increase work of breathing last night  States that doing MDI without relief  No nebulizer at home  Pt tachypnic with coarse breathing upon arrival  Reports was intubated as a child, but none as an adult  Pt reports last hospitalization for asthma was Nov 2017       34 y o  M w/ HIV and asthma p/w SOB x 2 days  Pt was seen here for the same 2 days ago, felt better after nebs/steroids  Pt returns for worsening symptoms  History provided by:  Patient   used: No    Asthma   Location:  Lungs  Duration:  2 days  Timing:  Constant  Progression:  Worsening  Chronicity:  New  Ineffective treatments:  Inhaler, steroids  Associated symptoms: rhinorrhea and wheezing    Associated symptoms: no abdominal pain, no chest pain, no congestion, no cough, no diarrhea, no ear pain, no fever, no nausea, no rash, no shortness of breath, no sore throat and no vomiting        Prior to Admission Medications   Prescriptions Last Dose Informant Patient Reported? Taking? Zidovudine (RETROVIR PO)   Yes Yes   Sig: Take by mouth daily   albuterol (PROAIR HFA) 90 mcg/act inhaler   No Yes   Sig: Inhale 2 puffs every 4 (four) hours as needed for wheezing   predniSONE 20 mg tablet   No Yes   Sig: Take 3 tablets (60 mg total) by mouth daily for 5 days      Facility-Administered Medications: None       Past Medical History:   Diagnosis Date    Asthma     HIV (human immunodeficiency virus infection) ([facility] [address] )        History reviewed  No pertinent surgical history  History reviewed  No pertinent family history  I have reviewed and agree with the history as documented      Social History   Substance Use Topics    Smoking status: Current Every Day Smoker     Types: Cigarettes    Smokeless tobacco: Never Used    Alcohol use No Review of Systems   Constitutional: Negative for chills and fever  HENT: Positive for rhinorrhea  Negative for congestion, ear discharge, ear pain, postnasal drip, sinus pressure, sneezing, sore throat and trouble swallowing  Eyes: Negative for discharge and redness  Respiratory: Positive for wheezing  Negative for cough and shortness of breath  Cardiovascular: Negative for chest pain  Gastrointestinal: Negative for abdominal pain, diarrhea, nausea and vomiting  Skin: Negative for rash  All other systems reviewed and are negative  Physical Exam  Physical Exam   Constitutional: He is oriented to person, place, and time  He appears well-developed and well-nourished  Non-toxic appearance  He does not have a sickly appearance  He does not appear ill  He appears distressed  HENT:   Head: Normocephalic and atraumatic  Right Ear: Tympanic membrane normal  No drainage  Tympanic membrane is not injected, not erythematous and not bulging  No middle ear effusion  Left Ear: Tympanic membrane normal  No drainage  Tympanic membrane is not injected, not erythematous and not bulging  No middle ear effusion  Nose: Nose normal    Mouth/Throat: Oropharynx is clear and moist  No oropharyngeal exudate  Eyes: Conjunctivae are normal  Right eye exhibits no discharge  Left eye exhibits no discharge  Right conjunctiva is not injected  Left conjunctiva is not injected  Neck: Normal range of motion  Neck supple  No neck rigidity  Cardiovascular: Regular rhythm, normal heart sounds and intact distal pulses  Tachycardia present  Exam reveals no gallop and no friction rub  No murmur heard  Pulmonary/Chest: No stridor  Tachypnea noted  He is in respiratory distress  He has wheezes  He has no rales  Abdominal: Soft  Bowel sounds are normal  He exhibits no distension  There is no tenderness  There is no rebound and no guarding  Lymphadenopathy:     He has no cervical adenopathy     Neurological: He is alert and oriented to person, place, and time  Skin: Skin is warm and dry  No rash noted  No pallor  Nursing note and vitals reviewed        Vital Signs  ED Triage Vitals [12/22/18 1010]   Temperature Pulse Respirations Blood Pressure SpO2   98 1 °F (36 7 °C) (!) 118 16 166/93 96 %      Temp Source Heart Rate Source Patient Position - Orthostatic VS BP Location FiO2 (%)   Oral Monitor Sitting Left arm --      Pain Score       9           Vitals:    12/22/18 1010 12/22/18 1146 12/22/18 1245 12/22/18 1514   BP: 166/93 148/74 148/80 141/94   Pulse: (!) 118 103 94 102   Patient Position - Orthostatic VS: Sitting Lying Lying Lying       Visual Acuity      ED Medications  Medications   sodium chloride 0 9 % bolus 1,300 mL ( Intravenous Canceled Entry 12/22/18 1254)   nicotine (NICODERM CQ) 7 mg/24hr TD 24 hr patch 1 patch (not administered)   calcium carbonate (TUMS) chewable tablet 1,000 mg (not administered)   benzonatate (TESSALON PERLES) capsule 100 mg (not administered)   enoxaparin (LOVENOX) subcutaneous injection 40 mg (not administered)   levalbuterol (XOPENEX) inhalation solution 1 25 mg (not administered)     And   sodium chloride 0 9 % inhalation solution 3 mL (not administered)   ipratropium (ATROVENT) 0 02 % inhalation solution 0 5 mg (not administered)   levofloxacin (LEVAQUIN) IVPB (premix) 750 mg (not administered)   acetaminophen (TYLENOL) tablet 650 mg (not administered)   albuterol inhalation solution 10 mg (10 mg Nebulization Given 12/22/18 1022)     And   ipratropium (ATROVENT) 0 02 % inhalation solution 1 mg (1 mg Nebulization Given 12/22/18 1022)     And   sodium chloride 0 9 % inhalation solution 3 mL (3 mL Nebulization Given 12/22/18 1022)   magnesium sulfate 2 g/50 mL IVPB (premix) 2 g (0 g Intravenous Stopped 12/22/18 1102)   methylPREDNISolone sodium succinate (Solu-MEDROL) injection 125 mg (125 mg Intravenous Given 12/22/18 1036)   sodium chloride 0 9 % bolus 1,000 mL (0 mL Intravenous Stopped 12/22/18 1153)   sodium chloride 0 9 % bolus 1,000 mL (1,000 mL Intravenous New Bag 12/22/18 1154)   levofloxacin (LEVAQUIN) IVPB (premix) 750 mg (750 mg Intravenous New Bag 12/22/18 1236)       Diagnostic Studies  Results Reviewed     Procedure Component Value Units Date/Time    T-helper cells CD4/CD8 % [[id number]] Collected:  12/22/18 1204    Lab Status: In process Specimen:  Blood from Arm, Left Updated:  12/22/18 1251    Lactic acid, plasma [[id number]]  (Normal) Collected:  12/22/18 1210    Lab Status:  Final result Specimen:  Blood from Arm, Left Updated:  12/22/18 1236     LACTIC ACID 1 8 mmol/L     Narrative:         Result may be elevated if tourniquet was used during collection  Blood culture #1 [[id number]] Collected:  12/22/18 1204    Lab Status: In process Specimen:  Blood from Arm, Left Updated:  12/22/18 1210    HIV-1 RNA, quantitative, PCR [[id number]] Collected:  12/22/18 1204    Lab Status: In process Specimen:  Blood from Arm, Left Updated:  12/22/18 1209    Blood culture #2 [[id number]] Collected:  12/22/18 1033    Lab Status: In process Specimen:  Blood from Arm, Right Updated:  12/22/18 1201    Lactic acid, plasma [[id number]]  (Abnormal) Collected:  12/22/18 1033    Lab Status:  Final result Specimen:  Blood from Arm, Right Updated:  12/22/18 1102     LACTIC ACID 2 2 (HH) mmol/L     Narrative:         Result may be elevated if tourniquet was used during collection      Basic metabolic panel [[id number]]  (Abnormal) Collected:  12/22/18 1033    Lab Status:  Final result Specimen:  Blood from Arm, Right Updated:  12/22/18 1050     Sodium 139 mmol/L      Potassium 3 9 mmol/L      Chloride 103 mmol/L      CO2 25 mmol/L      ANION GAP 11 mmol/L      BUN 15 mg/dL      Creatinine 1 22 mg/dL      Glucose 183 (H) mg/dL      Calcium 9 7 mg/dL      eGFR 85 ml/min/1 73sq m     Narrative:         National Kidney Disease Education Program recommendations are as follows:  GFR calculation is accurate only with a steady state creatinine  Chronic Kidney disease less than 60 ml/min/1 73 sq  meters  Kidney failure less than 15 ml/min/1 73 sq  meters  CBC and differential [[id number]]  (Abnormal) Collected:  12/22/18 1033    Lab Status:  Final result Specimen:  Blood from Arm, Right Updated:  12/22/18 1039     WBC 9 38 Thousand/uL      RBC 5 90 (H) Million/uL      Hemoglobin 16 0 g/dL      Hematocrit 50 4 (H) %      MCV 85 fL      MCH 27 1 pg      MCHC 31 7 g/dL      RDW 15 1 %      MPV 9 1 fL      Platelets 094 Thousands/uL      nRBC 0 /100 WBCs      Neutrophils Relative 78 (H) %      Immat GRANS % 1 %      Lymphocytes Relative 14 %      Monocytes Relative 7 %      Eosinophils Relative 0 %      Basophils Relative 0 %      Neutrophils Absolute 7 27 Thousands/µL      Immature Grans Absolute 0 09 Thousand/uL      Lymphocytes Absolute 1 35 Thousands/µL      Monocytes Absolute 0 65 Thousand/µL      Eosinophils Absolute 0 01 Thousand/µL      Basophils Absolute 0 01 Thousands/µL                  XR chest 2 views   ED Interpretation by Allison Jimenez 24, DO (12/22 1031)   No acute abnormalities      Final Result by Ricardo Mckeon MD (12/22 1033)   No acute cardiopulmonary disease        Stable exam      Workstation performed: [id number]                    Procedures  CriticalCare Time  Performed by: Amna Rosenberg by: Yasmine Schmitt     Critical care provider statement:     Critical care time (minutes):  75    Critical care time was exclusive of:  Separately billable procedures and treating other patients and teaching time    Critical care was necessary to treat or prevent imminent or life-threatening deterioration of the following conditions:  Respiratory failure    Critical care was time spent personally by me on the following activities:  Blood draw for specimens, obtaining history from patient or surrogate, development of treatment plan with patient or surrogate, discussions with consultants, evaluation of patient's response to treatment, examination of patient, ordering and performing treatments and interventions, ordering and review of laboratory studies, ordering and review of radiographic studies, re-evaluation of patient's condition and review of old charts    I assumed direction of critical care for this patient from another provider in my specialty: no             Phone Contacts  ED Phone Contact    ED Course  ED Course as of Dec 22 1522   Sat Dec 22, 2018   1143 Pt states he's doing a little better, but still having wheezing  Pt has h/o admissions for his asthma and feels like he did when he was previously admitted  Will have pt admitted for serial nebs  Initial Sepsis Screening     Row Name 12/22/18 1144                Is the patient's history suggestive of a new or worsening infection? (!)  Yes (Proceed)  -CR        Suspected source of infection pneumonia  -CR        Are two or more of the following signs & symptoms of infection both present and new to the patient? No  -CR        Indicate SIRS criteria Tachycardia > 90 bpm;Tachypnea > 20 resp per min  -CR        If the answer is yes to both questions, suspicion of sepsis is present          If severe sepsis is present AND tissue hypoperfusion perists in the hour after fluid resuscitation or lactate > 4, the patient meets criteria for SEPTIC SHOCK          Are any of the following organ dysfunction criteria present within 6 hours of suspected infection and SIRS criteria that are NOT considered to be chronic conditions? (!)  Yes  -CR        Organ dysfunction Lactate > 2 0 mmol/L  -CR        Date of presentation of severe sepsis          Time of presentation of severe sepsis          Tissue hypoperfusion persists in the hour after crystalloid fluid administration, evidenced, by either:          Was hypotension present within one hour of the conclusion of crystalloid fluid administration?           Date of presentation of septic shock          Time of presentation of septic shock            User Key  (r) = Recorded By, (t) = Taken By, (c) = Cosigned By    Initials Name Provider Type    MICHELLE Jimenez 24, DO Physician                  MDM  CritCare Time    Disposition  Final diagnoses:   Asthma exacerbation   HIV (human immunodeficiency virus infection) ([facility] )     Time reflects when diagnosis was documented in both MDM as applicable and the Disposition within this note     Time User Action Codes Description Comment    12/22/2018 11:55 AM Hina Hong Add [J45 901] Asthma exacerbation     12/22/2018 11:55 AM Hina Hong Add [B20] HIV (human immunodeficiency virus infection) ([facility] )     12/22/2018 12:02 PM Theadore Carrel Modify [B20] HIV (human immunodeficiency virus infection) ([facility] )     12/22/2018 12:02 PM Theadore Carrel Add [A41 9] Sepsis ([place] [id number] )     12/22/2018 12:02 PM Theadore Carrel Modify [B20] HIV (human immunodeficiency virus infection) ([facility] )     12/22/2018 12:02 PM Theadore Carrel Modify [A41 9] Sepsis ([place] [id number] )     12/22/2018 12:02 PM Theadore Carrel Modify [B20] HIV (human immunodeficiency virus infection) ([facility] )     12/22/2018 12:02 PM Theadore Carrel Modify [B20] HIV (human immunodeficiency virus infection) [facility])       ED Disposition     ED Disposition Condition Comment    Admit  Case was discussed with Dr Kolby Lawler and the patient's admission status was agreed to be Admission Status: observation status to the service of Dr Kolby Lawler          Follow-up Information    None         Current Discharge Medication List      CONTINUE these medications which have NOT CHANGED    Details   albuterol (PROAIR HFA) 90 mcg/act inhaler Inhale 2 puffs every 4 (four) hours as needed for wheezing  Qty: 8 5 g, Refills: 0    Associated Diagnoses: Asthma exacerbation      predniSONE 20 mg tablet Take 3 tablets (60 mg total) by mouth daily for 5 days  Qty: 15 tablet, Refills: 0    Associated Diagnoses: Asthma exacerbation      Zidovudine (RETROVIR PO) Take by mouth daily           No discharge procedures on file      ED Provider  Electronically Signed by           Allison Jimenez 24, DO  12/22/18 3316

## 2018-12-22 NOTE — ED NOTES
Pt reports stared with a cold a few days ago, states asthma started to worsen then  Pt reports was evaluated here on Thursday d/c with steroid but reports continued SOB  Upon arrival pt tachypnic with labored respirations and audible wheezing noted        Carlie Bello RN  12/22/18 4520

## 2018-12-22 NOTE — PLAN OF CARE
CARDIOVASCULAR - ADULT     Maintains optimal cardiac output and hemodynamic stability Progressing     Absence of cardiac dysrhythmias or at baseline rhythm Progressing        DISCHARGE PLANNING - CARE MANAGEMENT     Discharge to post-acute care or home with appropriate resources Progressing        GASTROINTESTINAL - ADULT     Minimal or absence of nausea and/or vomiting Progressing     Maintains adequate nutritional intake Progressing        HEMATOLOGIC - ADULT     Maintains hematologic stability Progressing        METABOLIC, FLUID AND ELECTROLYTES - ADULT     Electrolytes maintained within normal limits Progressing     Fluid balance maintained Progressing     Glucose maintained within target range Progressing        RESPIRATORY - ADULT     Achieves optimal ventilation and oxygenation Progressing

## 2018-12-22 NOTE — ASSESSMENT & PLAN NOTE
Likely secondary viral URI versus bronchitis    Consider atypical etiology given HIV  Patient room air with good respiratory excursion but diffuse expiratory polyphonic wheezes  Patient maintained on Qvar and albuterol inhaler with acute exacerbation  Continue IV solumedrol 40mg q 8 h, RTC updraft nebulizer therapies

## 2018-12-22 NOTE — CONSULTS
Consult- Zaki Mcallister 1980, 34 y o  male MRN: [id number]    Unit/Bed#: E5 -01 Encounter: [id number]    Primary Care Provider: Marlys Roblero   Date and time admitted to hospital: 12/22/2018 10:06 AM      Consults    No new Assessment & Plan notes have been filed under this hospital service since the last note was generated  Service: Pulmonology    Pulmonary Consultation   Zaki Mcallister 34 y o  male MRN: [id number]  Unit/Bed#: E5 -01 Encounter: [id number]      Reason for consultation:  Acute exacerbation of severe persistent asthma  Requesting physician:  Chino Randall  Impressions/Recommendations:     · Acute exacerbation of severe persistent asthma  High risk for respiratory decompensation  I took the liberty of switching his nebulizers to DuoNeb q 4 hours while awake with albuterol q 2 hours p r n     I continued his Solu-Medrol for an additional 2 days  If he continues to be dyspneic, I would start him on continuous nebulizers with albuterol for a period of an hour and upgrade him to at least the step-down unit  At that point I would add intravenous magnesium as well  · Patient should follow up with us after discharge, 2-4 weeks with any provider  History of Present Illness   HPI:  Zaki Mcallister is a 34 y o  male who presents with a 1 week history of an acute exacerbation of his severe persistent asthma  He started to feel sick on Tuesday  He presented to the emergency room on Thursday, and was treated with steroids, inhalers, but was not felt to need admission at the time  He initially felt better after his treatment in the emergency room, however, upon returning home he realizes he probably should have re-presented to our care the following day  He complains currently of dyspnea on walking a few feet  He has a persisting cough which is minimally productive of sputum    He has needed his rescue inhaler many times a day, although that does not appear to be too different from his usual baseline  His usual home medication regimen includes only QVAR with a rescue inhaler, which again he uses multiple times a day at baseline  He denies any sick contacts  He does not feel as though outside of an exacerbation of his asthma, he is otherwise ill  He has a history of multiple hospital visits per year for his asthma, with his most recent 1 being in August where he was admitted for 5 days by his report  Prior to that he was last admitted in December  He has never been intubated on account of his asthma  Review of systems:  Patient denies headache or vision changes  Weight is stable  Denies sore throat or runny nose  Denies fever, chills or sweats  Denies chest pain or palpitations  Denies nausea, vomiting or diarrhea  Denies lower extremity edema  Denies skin rashes  Denies dysuria or hematuria  All other 12-point review of systems are negative  Historical Information   Past Medical History:   Diagnosis Date    Asthma     HIV (human immunodeficiency virus infection) ([facility] [address] )      History reviewed  No pertinent surgical history  History reviewed  No pertinent family history  Tobacco history:  He is an active smoker of about 5 cigarettes per day  He lives in the house with other smokers however they smoke outside  Family history:  Noncontributory      Meds/Allergies   Current Facility-Administered Medications   Medication Dose Route Frequency    acetaminophen (TYLENOL) tablet 650 mg  650 mg Oral Q6H PRN    albuterol (PROVENTIL HFA,VENTOLIN HFA) inhaler 2 puff  2 puff Inhalation Q2H PRN    benzonatate (TESSALON PERLES) capsule 100 mg  100 mg Oral TID    calcium carbonate (TUMS) chewable tablet 1,000 mg  1,000 mg Oral Daily PRN    diphenhydrAMINE (BENADRYL) injection 25 mg  25 mg Intravenous Q6H [facility]    enoxaparin (LOVENOX) subcutaneous injection 40 mg  40 mg Subcutaneous Daily    ipratropium-albuterol (DUO-NEB) 0 5-2 5 mg/3 mL inhalation solution 3 mL  3 mL Nebulization Q4H While Awake    ketorolac (TORADOL) injection 30 mg  30 mg Intravenous Q6H [address]    [START ON 12/23/2018] levofloxacin (LEVAQUIN) IVPB (premix) 750 mg  750 mg Intravenous Q24H    methylPREDNISolone sodium succinate (Solu-MEDROL) injection 40 mg  40 mg Intravenous Q8H [address]    metoclopramide (REGLAN) injection 10 mg  10 mg Intravenous Q6H [address]    nicotine (NICODERM CQ) 7 mg/24hr TD 24 hr patch 1 patch  1 patch Transdermal Daily    sodium chloride 0 9 % bolus 1,300 mL  1,300 mL Intravenous Once     Allergies   Allergen Reactions    Penicillins Itching       Vitals: Blood pressure 141/94, pulse 102, temperature 99 °F (37 2 °C), temperature source Temporal, resp  rate 21, weight 110 kg (241 lb 10 oz), SpO2 96 %  ,  There is no height or weight on file to calculate BMI  Intake/Output Summary (Last 24 hours) at 12/22/18 1739  Last data filed at 12/22/18 1153   Gross per 24 hour   Intake             1050 ml   Output                0 ml   Net             1050 ml       Physical exam:    General Appearance:    Alert, cooperative, no conversational dyspnea or   accessory muscle use       Head/eyes:    Normocephalic, without obvious abnormality, atraumatic,         PERRL, extraocular muscles intact, no scleral icterus    Nose:   Nares normal, septum midline, mucosa normal, no drainage    or sinus tenderness   Throat:   Moist mucous membranes, no thrush   Neck:   Supple, trachea midline, no adenopathy; no carotid    bruit or JVD   Lungs:     Diffuse wheezing in all lung fields, with no focal areas of worsening     Chest Wall:    No tenderness or deformity    Heart:    Regular rate and rhythm, S1 and S2 normal, no murmur, rub   or gallop   Abdomen:     Soft, non-tender, bowel sounds active all four quadrants,     no masses, no organomegaly   Extremities:   Extremities normal, atraumatic, no cyanosis or edema   Skin:   Warm, dry, turgor normal, no rashes or lesions   Lymph nodes:   Cervical and supraclavicular nodes normal   Neurologic:   CNII-XII intact, normal strength, non-focal     Labs: I have personally reviewed pertinent lab results  Results from last 7 days  Lab Units 12/22/18  1033 12/20/18  1002   WBC Thousand/uL 9 38 6 52   HEMOGLOBIN g/dL 16 0 15 7   HEMATOCRIT % 50 4* 49 8*   PLATELETS Thousands/uL 258 262           Results from last 7 days  Lab Units 12/22/18  1033 12/20/18  1002   POTASSIUM mmol/L 3 9 4 1   CHLORIDE mmol/L 103 104   CO2 mmol/L 25 26   BUN mg/dL 15 10   CREATININE mg/dL 1 22 1 17   CALCIUM mg/dL 9 7 9 3               Imaging and other studies: I have personally reviewed pertinent reports  Pulmonary function testing:  No tests are available in our system  Other Studies: I have personally reviewed pertinent reports  Code Status: Level 1 - Full Code    Thank you for allowing us to participate in the care of your patient      Lupe Negron MD

## 2018-12-23 PROBLEM — J21.0 RESPIRATORY SYNCYTIAL VIRUS (RSV) BRONCHIOLITIS: Status: ACTIVE | Noted: 2018-12-23

## 2018-12-23 LAB
BASOPHILS # BLD AUTO: 0 X10E3/UL (ref 0–0.2)
BASOPHILS # BLD AUTO: 0.01 THOUSANDS/ΜL (ref 0–0.1)
BASOPHILS NFR BLD AUTO: 0 %
BASOPHILS NFR BLD AUTO: 0 % (ref 0–1)
CD3+CD4+ CELLS # BLD: 186 /UL (ref 359–1519)
CD3+CD4+ CELLS NFR BLD: 23.2 % (ref 30.8–58.5)
CD3+CD4+ CELLS/CD3+CD8+ CLL BLD: 0.54 % (ref 0.92–3.72)
CD3+CD8+ CELLS # BLD: 342 /UL (ref 109–897)
CD3+CD8+ CELLS NFR BLD: 42.7 % (ref 12–35.5)
EOSINOPHIL # BLD AUTO: 0 THOUSAND/ΜL (ref 0–0.61)
EOSINOPHIL # BLD AUTO: 0 X10E3/UL (ref 0–0.4)
EOSINOPHIL NFR BLD AUTO: 0 %
EOSINOPHIL NFR BLD AUTO: 0 % (ref 0–6)
ERYTHROCYTE [DISTWIDTH] IN BLOOD BY AUTOMATED COUNT: 15.2 % (ref 11.6–15.1)
ERYTHROCYTE [DISTWIDTH] IN BLOOD BY AUTOMATED COUNT: 15.5 % (ref 12.3–15.4)
FLUAV AG SPEC QL: ABNORMAL
FLUBV AG SPEC QL: ABNORMAL
HCT VFR BLD AUTO: 40.3 % (ref 37.5–51)
HCT VFR BLD AUTO: 47.9 % (ref 36.5–49.3)
HGB BLD-MCNC: 13.3 G/DL (ref 13–17.7)
HGB BLD-MCNC: 14.9 G/DL (ref 12–17)
IMM GRANULOCYTES # BLD AUTO: 0.09 THOUSAND/UL (ref 0–0.2)
IMM GRANULOCYTES # BLD: 0 X10E3/UL (ref 0–0.1)
IMM GRANULOCYTES NFR BLD AUTO: 1 % (ref 0–2)
IMM GRANULOCYTES NFR BLD: 0 %
LYMPHOCYTES # BLD AUTO: 0.74 THOUSANDS/ΜL (ref 0.6–4.47)
LYMPHOCYTES # BLD AUTO: 0.8 X10E3/UL (ref 0.7–3.1)
LYMPHOCYTES NFR BLD AUTO: 7 %
LYMPHOCYTES NFR BLD AUTO: 8 % (ref 14–44)
MCH RBC QN AUTO: 27 PG (ref 26.8–34.3)
MCH RBC QN AUTO: 27.7 PG (ref 26.6–33)
MCHC RBC AUTO-ENTMCNC: 31.1 G/DL (ref 31.4–37.4)
MCHC RBC AUTO-ENTMCNC: 33 G/DL (ref 31.5–35.7)
MCV RBC AUTO: 84 FL (ref 79–97)
MCV RBC AUTO: 87 FL (ref 82–98)
MONOCYTES # BLD AUTO: 0.4 THOUSAND/ΜL (ref 0.17–1.22)
MONOCYTES # BLD AUTO: 0.7 X10E3/UL (ref 0.1–0.9)
MONOCYTES NFR BLD AUTO: 4 % (ref 4–12)
MONOCYTES NFR BLD AUTO: 7 %
NEUTROPHILS # BLD AUTO: 8.27 THOUSANDS/ΜL (ref 1.85–7.62)
NEUTROPHILS # BLD AUTO: 8.8 X10E3/UL (ref 1.4–7)
NEUTROPHILS NFR BLD AUTO: 86 %
NEUTS SEG NFR BLD AUTO: 87 % (ref 43–75)
NRBC BLD AUTO-RTO: 0 /100 WBCS
PLATELET # BLD AUTO: 248 THOUSANDS/UL (ref 149–390)
PLATELET # BLD AUTO: 264 X10E3/UL (ref 150–379)
PMV BLD AUTO: 8.8 FL (ref 8.9–12.7)
RBC # BLD AUTO: 4.81 X10E6/UL (ref 4.14–5.8)
RBC # BLD AUTO: 5.52 MILLION/UL (ref 3.88–5.62)
RSV B RNA SPEC QL NAA+PROBE: DETECTED
WBC # BLD AUTO: 10.3 X10E3/UL (ref 3.4–10.8)
WBC # BLD AUTO: 9.51 THOUSAND/UL (ref 4.31–10.16)

## 2018-12-23 PROCEDURE — 94640 AIRWAY INHALATION TREATMENT: CPT

## 2018-12-23 PROCEDURE — 85025 COMPLETE CBC W/AUTO DIFF WBC: CPT | Performed by: INTERNAL MEDICINE

## 2018-12-23 PROCEDURE — 94760 N-INVAS EAR/PLS OXIMETRY 1: CPT

## 2018-12-23 PROCEDURE — 99232 SBSQ HOSP IP/OBS MODERATE 35: CPT | Performed by: INTERNAL MEDICINE

## 2018-12-23 RX ORDER — PREDNISONE 20 MG/1
40 TABLET ORAL DAILY
Status: DISCONTINUED | OUTPATIENT
Start: 2018-12-24 | End: 2018-12-24 | Stop reason: HOSPADM

## 2018-12-23 RX ADMIN — KETOROLAC TROMETHAMINE 30 MG: 30 INJECTION, SOLUTION INTRAMUSCULAR at 17:26

## 2018-12-23 RX ADMIN — KETOROLAC TROMETHAMINE 30 MG: 30 INJECTION, SOLUTION INTRAMUSCULAR at 11:32

## 2018-12-23 RX ADMIN — BENZONATATE 100 MG: 100 CAPSULE ORAL at 21:04

## 2018-12-23 RX ADMIN — LEVOFLOXACIN 750 MG: 5 INJECTION, SOLUTION INTRAVENOUS at 11:32

## 2018-12-23 RX ADMIN — METOCLOPRAMIDE HYDROCHLORIDE 10 MG: 5 INJECTION INTRAMUSCULAR; INTRAVENOUS at 06:24

## 2018-12-23 RX ADMIN — METOCLOPRAMIDE HYDROCHLORIDE 10 MG: 5 INJECTION INTRAMUSCULAR; INTRAVENOUS at 00:04

## 2018-12-23 RX ADMIN — KETOROLAC TROMETHAMINE 30 MG: 30 INJECTION, SOLUTION INTRAMUSCULAR at 06:24

## 2018-12-23 RX ADMIN — ENOXAPARIN SODIUM 40 MG: 40 INJECTION SUBCUTANEOUS at 09:39

## 2018-12-23 RX ADMIN — IPRATROPIUM BROMIDE AND ALBUTEROL SULFATE 3 ML: 2.5; .5 SOLUTION RESPIRATORY (INHALATION) at 18:47

## 2018-12-23 RX ADMIN — ALBUTEROL SULFATE 2 PUFF: 90 AEROSOL, METERED RESPIRATORY (INHALATION) at 21:05

## 2018-12-23 RX ADMIN — METOCLOPRAMIDE HYDROCHLORIDE 10 MG: 5 INJECTION INTRAMUSCULAR; INTRAVENOUS at 11:43

## 2018-12-23 RX ADMIN — DIPHENHYDRAMINE HYDROCHLORIDE 25 MG: 50 INJECTION, SOLUTION INTRAMUSCULAR; INTRAVENOUS at 11:32

## 2018-12-23 RX ADMIN — BENZONATATE 100 MG: 100 CAPSULE ORAL at 09:38

## 2018-12-23 RX ADMIN — IPRATROPIUM BROMIDE AND ALBUTEROL SULFATE 3 ML: 2.5; .5 SOLUTION RESPIRATORY (INHALATION) at 07:04

## 2018-12-23 RX ADMIN — DIPHENHYDRAMINE HYDROCHLORIDE 25 MG: 50 INJECTION, SOLUTION INTRAMUSCULAR; INTRAVENOUS at 06:24

## 2018-12-23 RX ADMIN — ALBUTEROL SULFATE 2 PUFF: 90 AEROSOL, METERED RESPIRATORY (INHALATION) at 16:34

## 2018-12-23 RX ADMIN — METOCLOPRAMIDE HYDROCHLORIDE 10 MG: 5 INJECTION INTRAMUSCULAR; INTRAVENOUS at 17:25

## 2018-12-23 RX ADMIN — METHYLPREDNISOLONE SODIUM SUCCINATE 40 MG: 40 INJECTION, POWDER, FOR SOLUTION INTRAMUSCULAR; INTRAVENOUS at 01:50

## 2018-12-23 RX ADMIN — IPRATROPIUM BROMIDE AND ALBUTEROL SULFATE 3 ML: 2.5; .5 SOLUTION RESPIRATORY (INHALATION) at 12:30

## 2018-12-23 RX ADMIN — METHYLPREDNISOLONE SODIUM SUCCINATE 40 MG: 40 INJECTION, POWDER, FOR SOLUTION INTRAMUSCULAR; INTRAVENOUS at 17:25

## 2018-12-23 RX ADMIN — BENZONATATE 100 MG: 100 CAPSULE ORAL at 16:32

## 2018-12-23 RX ADMIN — DIPHENHYDRAMINE HYDROCHLORIDE 25 MG: 50 INJECTION, SOLUTION INTRAMUSCULAR; INTRAVENOUS at 17:25

## 2018-12-23 RX ADMIN — METHYLPREDNISOLONE SODIUM SUCCINATE 40 MG: 40 INJECTION, POWDER, FOR SOLUTION INTRAMUSCULAR; INTRAVENOUS at 09:38

## 2018-12-23 RX ADMIN — NICOTINE 1 PATCH: 7 PATCH, EXTENDED RELEASE TRANSDERMAL at 09:38

## 2018-12-23 NOTE — UTILIZATION REVIEW
[address]  Utilization Review Department  Phone: [phone]; Fax [phone]  [email or web address]  ATTENTION: Please call with any questions or concerns to [phone]  and carefully listen to the prompts so that you are directed to the right person  Send all requests for admission clinical reviews, approved or denied determinations and any other requests to fax [phone]  All voicemails are confidential   Initial Clinical Review    Admission: Date/Time/Statement: [email or web address] UPGRADED TO  INPT 12/22/18 @ 1455   12/22/18 1456  Inpatient Admission Once     Transfer Service: General Medicine       Question Answer Comment   Admitting Physician FIORDALIZA Og    Level of Care Med Surg    Estimated length of stay More than 2 Midnights    Certification I certify that inpatient services are medically necessary for this patient for a duration of greater than two midnights  See H&P and MD Progress Notes for additional information about the patient's course of treatment  ED: Date/Time/Mode of Arrival:   ED Arrival Information     Expected Arrival Acuity Means of Arrival Escorted By Service Admission Type    - 12/22/2018 09:57 Emergent Walk-In Self General Medicine Emergency    Arrival Complaint    SOB,Asthma          Chief Complaint:   Chief Complaint   Patient presents with    Asthma     Reports was evaluated at this facility on Thursday for same  States given steroids  Reports taking as directed  Reports started having increase work of breathing last night  States that doing MDI without relief  No nebulizer at home  Pt tachypnic with coarse breathing upon arrival  Reports was intubated as a child, but none as an adult  Pt reports last hospitalization for asthma was Nov 2017          History of Illness: 34 y o  male who presents with PMH of HIV, asthma, current quarter pack per day smoker coming for shortness of breath cough sinus congestion last days  Patient reports that the week prior to admission he received a Gardasil injection  He also was started on Biktarvy from his ID doctor in [facility] where he lived up until 08/2018 when he moved up to the [place]  He reports that starting Tuesday while he is at work he started developed sinus congestion, postnasal drip, sore throat, cough productive of white/tan sputum, with worsening shortness of breath  The patient reports he normally takes Qvar and albuterol for his asthma  He is being started on new medication but he does not recall the name of this  He reports over the last 2 days his sob started to ramp up where he was having dyspnea on exertion lasting upward to 10 minutes  The day prior to admission he had pnd  He has myalgias, and chills, ha and nausea  No known sick contacts  No travel in last 3-6 mo  His last cd4 ct was [address] by his recall and does not remember what his viral load was  He is compliant with his ART  He has been hospitalized 2x/in last year for asthma      ED Vital Signs:   ED Triage Vitals [12/22/18 1010]   Temperature Pulse Respirations Blood Pressure SpO2   98 1 °F (36 7 °C) (!) 118 16 166/93 96 %      Temp Source Heart Rate Source Patient Position - Orthostatic VS BP Location FiO2 (%)   Oral Monitor Sitting Left arm --      Pain Score       9        Wt Readings from Last 1 Encounters:   12/22/18 110 kg (241 lb 10 oz)       Vital Signs (abnormal):   12/22/18 1245  98 7 °F (37 1 °C)  94  20  148/80  95 %  None (Room air)  Lying   12/22/18 1146  --  103  20  148/74  97 %  None (Room air)  Lying   12/22/18 1033  --  --  --  --  --  None (Room air)  --   12/22/18 1014  --  --   24  --  --  --  --   12/22/18 1010  98 1 °F (36 7 °C)   118  16  166/93  96 %  None (Room air)         Abnormal Labs/Diagnostic Test Results:   hct 50 4    Cxr:wnl      ED Treatment:   Medication Administration from 12/22/2018 0957 to 12/22/2018 1245       Date/Time Order Dose Route Action Action by Comments     12/22/2018 1022 albuterol inhalation solution 10 mg 10 mg Nebulization Given Esaw Carp, RT      12/22/2018 1022 ipratropium (ATROVENT) 0 02 % inhalation solution 1 mg 1 mg Nebulization Given Esaw Carp, RT      12/22/2018 1022 sodium chloride 0 9 % inhalation solution 3 mL 3 mL Nebulization Given Esaw Carp, RT      12/22/2018 1102 magnesium sulfate 2 g/50 mL IVPB (premix) 2 g 0 g Intravenous Stopped Arnulfo Prado RN      12/22/2018 1039 magnesium sulfate 2 g/50 mL IVPB (premix) 2 g 2 g Intravenous New Bag Arnulfo Prado RN      12/22/2018 1036 methylPREDNISolone sodium succinate (Solu-MEDROL) injection 125 mg 125 mg Intravenous Given Arnulfo Prado RN      12/22/2018 1153 sodium chloride 0 9 % bolus 1,000 mL 0 mL Intravenous Stopped Fe Lofton RN      12/22/2018 1035 sodium chloride 0 9 % bolus 1,000 mL 1,000 mL Intravenous New Bag Arnulfo Prado RN      12/22/2018 1154 sodium chloride 0 9 % bolus 1,000 mL 1,000 mL Intravenous New Bag Fe Lofton RN      12/22/2018 1236 levofloxacin (LEVAQUIN) IVPB (premix) 750 mg 750 mg Intravenous New Bag Fe Lofton RN           Past Medical/Surgical History: Active Ambulatory Problems     Diagnosis Date Noted    No Active Ambulatory Problems     Resolved Ambulatory Problems     Diagnosis Date Noted    No Resolved Ambulatory Problems     Past Medical History:   Diagnosis Date    Asthma     HIV (human immunodeficiency virus infection) ([place] [address] )        Admitting Diagnosis: Asthma [J45 909]  HIV (human immunodeficiency virus infection) ([place] [address] ) [B20]  Asthma exacerbation [J45 901]  Sepsis ([place] [address] ) [A41 9]    Age/Sex: 34 y o  male    Assessment/Plan: 35 yo male c/o sob, cough, congestion with sepsis, s/t URI with asthma exacerbation vs bronchitis with hx HIV, cxr, levaquin, bld cx, iv solumedrol, Qvar and albuterol    HIV -Biktarvy, F/u cd4 and viral load draw, if found to have AIDS consider ID consultation and possible IRS  Anticipated Length of Stay:  Patient will be admitted on an Inpatient basis with an anticipated length of stay of  Greater than 2 midnights     Justification for Hospital Stay: asthma w/acute exacerbation, hiv    Admission Orders:  INPT  CONSULT PULM  REG DIET  SEQ COMP DEVICE    Scheduled Meds:   Current Facility-Administered Medications:  acetaminophen 650 mg Oral Q6H PRN Brian Carbone PA-C    albuterol 2 puff Inhalation Q2H PRN Nargis Shoemaker MD    benzonatate 100 mg Oral TID Madisyn Huff PA-C    calcium carbonate 1,000 mg Oral Daily PRN Madisyn Huff PA-C    diphenhydrAMINE 25 mg Intravenous Q6H [address] Madisyn Huff PA-C    enoxaparin 40 mg Subcutaneous Daily Madisyn Huff PA-C    ipratropium-albuterol 3 mL Nebulization Q4H While Awake Nargis Shoemaker MD    ketorolac 30 mg Intravenous Q6H [address] Madisyn Huff PA-C    levofloxacin 750 mg Intravenous Q24H Madisyn Huff PA-C Last Rate: 750 mg (12/23/18 1132)   methylPREDNISolone sodium succinate 40 mg Intravenous Q8H Italia Galvan MD    metoclopramide 10 mg Intravenous Q6H [address] Madisyn Huff PA-C    nicotine 1 patch Transdermal Daily Madisyn Huff PA-C    sodium chloride 1,300 mL Intravenous Once Hina Hong DO      Continuous Infusions:    PRN Meds:   acetaminophen    albuterol    calcium carbonate

## 2018-12-23 NOTE — PROGRESS NOTES
Progress Note - Pulmonary   Shivam Jones 34 y o  male MRN: [id number]  Unit/Bed#: E5 -01 Encounter: [id number]      Assessment:  · Acute exacerbation of severe persistent asthma  Plan:  · Continue solumedrol through today  Start prednisone 40mg with taper over 2 weeks starting tomorrow  · Patient should be ordered a nebulizer at discharge, with nebulized albuterol  · Follow up with pulmonary 2-4 weeks after discharge, any provider  Subjective:   Feeling much better than yesterday  Still describes tightness  Producing significant amounts of mucus and having rhinorrea  Far less dyspneic  10-point ROS otherwise negative  Vitals: Blood pressure 135/73, pulse 90, temperature 98 4 °F (36 9 °C), temperature source Temporal, resp  rate 18, weight 110 kg (241 lb 10 oz), SpO2 94 %  , There is no height or weight on file to calculate BMI  No intake or output data in the 24 hours ending 12/23/18 1448    Physical Exam  Gen: Awake, alert, oriented x 3, no acute distress  HEENT: Mucous membranes moist, no oral lesions, no thrush  NECK: No accessory muscle use, JVP not elevated  Cardiac: Regular, single S1, single S2, no murmurs, no rubs, no gallops  Lungs: L > R end-expiratory wheezing  Abdomen: normoactive bowel sounds, soft nontender, nondistended, no rebound or rigidity, no guarding  Extremities: no cyanosis, no clubbing, no edema    Labs: I have personally reviewed pertinent lab results          Ruperto Jara MD

## 2018-12-23 NOTE — PROGRESS NOTES
[facility] Progress Note  Patient: Ella Milian 34 y o  male   MRN: [id number]  PCP: Zoraida Rinaldi  Unit/Bed#: E5 -00 Encounter: [id number]  Date Of Visit: 12/23/18    Assessment:  Patient presents as a 25-year-old with known history of HIV and followed in [facility] by Infectious Disease and has been compliant with his HAART meds with complaint of diffuse body aches cough and wheezing  Has a history of intermittent asthma on average having 2 to 3 attacks a year  And had apparently been placed on a steroid tapered schedule last 5 days without much improvement prompting ED eval   He required Florina Songster neb without much relief and did present with minor lactic acidosis that has subsequently normalized with a white count of 9300 and a clear chest x-ray he describes a persistent headache that is specially aggravated by intractable coughing  Unfortunately is still a smoker  On exam he has no evidence of any meningismus his initial presentation of tachycardia has diminished somewhat and his lung exam showing adequate breath sounds but inspiratory and expiratory wheezes prominent without accessory muscle usage  He was given a dose of Levaquin in the ED and been continued on the floor he received a IV Solu-Medrol in the ED and that will be continued with duo nebs every 4 hours no requirements for oxygen as yet  Presentation seems to be viral etiology but will get procalcitonin to assess further influenza PCR pending  Apparently has had a fairly recent CD4 count of approximately 1200 a could not recall of viral load values these were drawn in the ED    Had been just started on Biktarvy  last month should viral load and/or CD4 counts dictate would consult Infectious Disease        Principal Problem:    Respiratory syncytial virus (RSV) bronchiolitis  Active Problems:    [facility] )    HIV (human immunodeficiency virus infection) ([facility] )    Severe persistent asthma with exacerbation      Plan:    · RSV, at this point treatment will continue be supportive with IV fluids and nebulizer therapy to treat his asthmatic bronchitis, treat myalgias and congestion  Droplet isolation  · Sepsis criteria as improved including resolution of tachycardia and lactic acidosis and no leukocytosis or febrile course at this point procalcitonin was normal in keeping with probable viral source  · Severe persistent asthma with exacerbation and appreciate Pulmonary input will continue IV steroids for at least another day has had some improvement in air movement continues to have diffuse wheezing continue duo nebs every 4 hr with p r n  Albuterol every 2      VTE Pharmacologic Prophylaxis:   Pharmacologic: Enoxaparin (Lovenox)  Mechanical VTE Prophylaxis in Place: Yes    Discussions with Specialists or Other Care Team Provider:  No    Time Spent for Care: 45 minutes  More than 50% of total time spent on counseling and coordination of care as described above  Subjective:   Breathing somewhat better and less dyspnea today able lie down denies any chills or fever overnight left myalgias referred main complaint is upper airway congestion in his nasal passages  Objective:     Vitals:   Temp (24hrs), Av 5 °F (36 9 °C), Min:97 8 °F (36 6 °C), Max:99 °F (37 2 °C)    Temp:  [97 8 °F (36 6 °C)-99 °F (37 2 °C)] 98 4 °F (36 9 °C)  HR:  [] 90  Resp:  [18-21] 18  BP: (135-148)/(68-94) 135/73  SpO2:  [91 %-97 %] 95 %  There is no height or weight on file to calculate BMI  Input and Output Summary (last 24 hours):        Intake/Output Summary (Last 24 hours) at 18 1100  Last data filed at 18 1153   Gross per 24 hour   Intake             1050 ml   Output                0 ml   Net             1050 ml       Physical Exam:     Physical Exam:   General appearance: alert, appears stated age and cooperative  Head: Normocephalic, without obvious abnormality, atraumatic  Lungs: rhonchi and wheezes  Heart: regular rate and rhythm  Abdomen: soft, non-tender; bowel sounds normal; no masses,  no organomegaly  Back: negative  Extremities: extremities normal, atraumatic, no cyanosis or edema  Neurologic: Grossly normal      Additional Data:     Labs:      Results from last 7 days  Lab Units 12/23/18  0539   WBC Thousand/uL 9 51   HEMOGLOBIN g/dL 14 9   HEMATOCRIT % 47 9   PLATELETS Thousands/uL 248   NEUTROS PCT % 87*   LYMPHS PCT % 8*   MONOS PCT % 4   EOS PCT % 0       Results from last 7 days  Lab Units 12/22/18  1033   POTASSIUM mmol/L 3 9   CHLORIDE mmol/L 103   CO2 mmol/L 25   BUN mg/dL 15   CREATININE mg/dL 1 22   CALCIUM mg/dL 9 7           * I Have Reviewed All Lab Data Listed Above  * Additional Pertinent Lab Tests Reviewed: All Labs For Current Hospital Admission Reviewed    Imaging:  Xr Chest 2 Views    Result Date: 12/22/2018  Narrative: CHEST INDICATION:   cough  Shortness of breath, asthma COMPARISON:  12/20/2018 chest x-ray EXAM PERFORMED/VIEWS:  XR CHEST PA & LATERAL Images: 4, dual-energy FINDINGS: Cardiomediastinal silhouette appears unremarkable  The lungs are clear  No pneumothorax or pleural effusion  Osseous structures appear within normal limits for patient age  Impression: No acute cardiopulmonary disease  Stable exam Workstation performed: [id number]     Xr Chest 2 Views    Result Date: 12/20/2018  Narrative: CHEST INDICATION:   Shortness of breath  2 days of cough  COMPARISON:  9/4/2018 EXAM PERFORMED/VIEWS:  XR CHEST PA & LATERAL  The frontal view was performed utilizing dual energy radiographic technique  Images: 4 FINDINGS: Cardiomediastinal silhouette appears unremarkable  The lungs are clear  No pneumothorax or pleural effusion  Osseous structures appear within normal limits for patient age  Impression: No acute cardiopulmonary disease   Workstation performed: [id number]     Imaging Reports Reviewed Today Include:   Imaging Personally Reviewed by Myself Includes:    Procedure: Xr Chest 2 Views    Result Date: 12/22/2018  Narrative: CHEST INDICATION:   cough  Shortness of breath, asthma COMPARISON:  12/20/2018 chest x-ray EXAM PERFORMED/VIEWS:  XR CHEST PA & LATERAL Images: 4, dual-energy FINDINGS: Cardiomediastinal silhouette appears unremarkable  The lungs are clear  No pneumothorax or pleural effusion  Osseous structures appear within normal limits for patient age  Impression: No acute cardiopulmonary disease  Stable exam Workstation performed: [id number]        Recent Cultures (last 7 days):       Results from last 7 days  Lab Units 12/22/18  1441   INFLUENZA B PCR  None Detected   RSV PCR  Detected*       Last 24 Hours Medication List:     Current Facility-Administered Medications:  acetaminophen 650 mg Oral Q6H PRN Ephraim Cruz PA-C   albuterol 2 puff Inhalation Q2H PRN Kristen Garza MD   benzonatate 100 mg Oral TID Madisyn Huff PA-C   calcium carbonate 1,000 mg Oral Daily PRN Madisyn Huff PA-C   diphenhydrAMINE 25 mg Intravenous Q6H [facility] Madisyn Huff PA-C   enoxaparin 40 mg Subcutaneous Daily Madisyn Huff PA-C   ipratropium-albuterol 3 mL Nebulization Q4H While Awake Kristen Garza MD   ketorolac 30 mg Intravenous Q6H 3 [facility] JEN Huff PA-C   levofloxacin 750 mg Intravenous Q24H Madisyn Huff PA-C   methylPREDNISolone sodium succinate 40 mg Intravenous Q8H Dorota Doe MD   metoclopramide 10 mg Intravenous Q6H 3 [facility] JEN Huff PA-C   nicotine 1 patch Transdermal Daily Madisyn Huff PA-C   sodium chloride 1,300 mL Intravenous Once [address], DO        Today, Patient Was Seen By: Abbi Geronimo MD    ** Please Note: Dragon 360 Dictation voice to text software may have been used in the creation of this document   **

## 2018-12-24 VITALS
WEIGHT: 241.62 LBS | SYSTOLIC BLOOD PRESSURE: 146 MMHG | OXYGEN SATURATION: 94 % | RESPIRATION RATE: 15 BRPM | DIASTOLIC BLOOD PRESSURE: 96 MMHG | HEART RATE: 82 BPM | TEMPERATURE: 98.8 F

## 2018-12-24 PROBLEM — A41.9 SEPSIS (HCC): Status: RESOLVED | Noted: 2018-12-22 | Resolved: 2018-12-24

## 2018-12-24 PROCEDURE — 94640 AIRWAY INHALATION TREATMENT: CPT

## 2018-12-24 PROCEDURE — 99239 HOSP IP/OBS DSCHRG MGMT >30: CPT | Performed by: HOSPITALIST

## 2018-12-24 PROCEDURE — 94760 N-INVAS EAR/PLS OXIMETRY 1: CPT

## 2018-12-24 PROCEDURE — 99232 SBSQ HOSP IP/OBS MODERATE 35: CPT | Performed by: INTERNAL MEDICINE

## 2018-12-24 RX ORDER — ACETAMINOPHEN 325 MG/1
TABLET ORAL
Qty: 30 TABLET | Refills: 0 | Status: SHIPPED | OUTPATIENT
Start: 2018-12-24 | End: 2020-05-29

## 2018-12-24 RX ORDER — PREDNISONE 10 MG/1
TABLET ORAL
Qty: 30 TABLET | Refills: 0 | Status: SHIPPED | OUTPATIENT
Start: 2018-12-24 | End: 2020-05-29

## 2018-12-24 RX ORDER — LEVOFLOXACIN 750 MG/1
750 TABLET ORAL EVERY 24 HOURS
Qty: 3 TABLET | Refills: 0 | Status: SHIPPED | OUTPATIENT
Start: 2018-12-24 | End: 2018-12-27

## 2018-12-24 RX ORDER — BENZONATATE 100 MG/1
100 CAPSULE ORAL 3 TIMES DAILY
Qty: 20 CAPSULE | Refills: 0 | Status: ON HOLD | OUTPATIENT
Start: 2018-12-24 | End: 2020-06-01 | Stop reason: SDUPTHER

## 2018-12-24 RX ADMIN — KETOROLAC TROMETHAMINE 30 MG: 30 INJECTION, SOLUTION INTRAMUSCULAR at 06:29

## 2018-12-24 RX ADMIN — DIPHENHYDRAMINE HYDROCHLORIDE 25 MG: 50 INJECTION, SOLUTION INTRAMUSCULAR; INTRAVENOUS at 06:28

## 2018-12-24 RX ADMIN — DIPHENHYDRAMINE HYDROCHLORIDE 25 MG: 50 INJECTION, SOLUTION INTRAMUSCULAR; INTRAVENOUS at 00:39

## 2018-12-24 RX ADMIN — ENOXAPARIN SODIUM 40 MG: 40 INJECTION SUBCUTANEOUS at 09:14

## 2018-12-24 RX ADMIN — PREDNISONE 40 MG: 20 TABLET ORAL at 09:14

## 2018-12-24 RX ADMIN — NICOTINE 1 PATCH: 7 PATCH, EXTENDED RELEASE TRANSDERMAL at 09:14

## 2018-12-24 RX ADMIN — IPRATROPIUM BROMIDE AND ALBUTEROL SULFATE 3 ML: 2.5; .5 SOLUTION RESPIRATORY (INHALATION) at 13:15

## 2018-12-24 RX ADMIN — IPRATROPIUM BROMIDE AND ALBUTEROL SULFATE 3 ML: 2.5; .5 SOLUTION RESPIRATORY (INHALATION) at 07:04

## 2018-12-24 RX ADMIN — METOCLOPRAMIDE HYDROCHLORIDE 10 MG: 5 INJECTION INTRAMUSCULAR; INTRAVENOUS at 00:39

## 2018-12-24 RX ADMIN — BENZONATATE 100 MG: 100 CAPSULE ORAL at 09:15

## 2018-12-24 RX ADMIN — KETOROLAC TROMETHAMINE 30 MG: 30 INJECTION, SOLUTION INTRAMUSCULAR at 00:39

## 2018-12-24 RX ADMIN — METOCLOPRAMIDE HYDROCHLORIDE 10 MG: 5 INJECTION INTRAMUSCULAR; INTRAVENOUS at 06:29

## 2018-12-24 NOTE — DISCHARGE SUMMARY
Discharge Summary - [facility] 34 y o  male MRN: [id number]    [facility] [place]  [address]/E5 -* Encounter: [id number]    BRIEF OVERVIEW      Admission Date: 12/22/2018       Discharge Date:  12/24/2018      Admitting Diagnosis:  Acute asthma exacerbation  Respiratory syncytial virus bronchiolitis    Primary Discharge Diagnosis  Principal Problem:    Respiratory syncytial virus (RSV) bronchiolitis  Active Problems:    HIV (human immunodeficiency virus infection) ([facility] )    Severe persistent asthma with exacerbation  Resolved Problems:    Sepsis ([facility] 75 )      Service:  [facility]    Consulting Providers   Pulmonology Dr Hreman Huerta      Assessment and plan on date of discharge  1-acute exacerbation of severe persistent asthma-precipitated by respiratory syncytial virus infection leading to tracheobronchitis  Much improved 2  Was on IV Solu-Medrol which has been changed to prednisone 40 mg daily with a taper over 2 weeks  Continue Levaquin for 2 more days  Continue albuterol inhalers  Will follow up with pulmonology as an outpatient      2-RSV bronchiolitis-stable  Continue supportive measures      3-sepsis secondary to 1 and 2--continue Levaquin for 2 more days     This is resolved     4-HIV infection--CD4 count of 186 CD4 to CD8 ratio 0 54  Patient recently started on [address]  Follows up with an ID specialist in [place]     Stable for discharge home    Discharge Condition: Improved    Discharge Disposition: Home/Self Care    Discharge summary:   Nash Santana is an extremely pleasant 43-year-old male who has a history of HIV and was recently started on Biktarvy-he follows up with an infectious disease specialist in [place]  Patient has severe persistent asthma and a week ago he developed worsening cough and wheezing  He also developed generalized myalgias    He presented to the emergency room initially and was treated with updraft nebulizers and felt better with the treatment and was discharged home  However he noted that he needed to take his rescue inhalers multiple times during the day and that he was not getting any better he denied any sick contacts  Patient's respiratory syncitial virus was positive  The patient improved with updraft nebulizers as well as steroids  He was then transitioned to oral prednisone with a slow taper  Patient will be discharged home on [facility] and will follow up with pulmonology as an outpatient  He will also follow up with his infectious disease doctor in [place] for his HIV management  His CD4 count was 186 with a CD4 CD 8 ratio of 0 54  He remains stable for discharge          Discharge Medications   Please see Medical Reconciliation Discharge Form    Discharge Follow Up Appointments:   PCP  Pulmonology  HIV specialist    Discharge  Statement   Total Time Spent today including physical exam, discussion with patient and family, and discharge arrangements/care44 minutes

## 2018-12-24 NOTE — PROGRESS NOTES
Progress Note - Polo Jones 34 y o  male MRN: [id number]    Unit/Bed#: E5 -01 Encounter: [id number]    Principal Problem:    Respiratory syncytial virus (RSV) bronchiolitis  Active Problems:    Sepsis ([facility] )    HIV (human immunodeficiency virus infection) ([place] [address] )    Severe persistent asthma with exacerbation  Resolved Problems:    * No resolved hospital problems  *      Assessment/Plan:  1-acute exacerbation of severe persistent asthma-precipitated by respiratory syncytial virus infection leading to tracheobronchitis  Much improved 2  Was on IV Solu-Medrol which has been changed to prednisone 40 mg daily with a taper over 2 weeks  Continue Levaquin for 2 more days  Continue albuterol inhalers  Will follow up with pulmonology as an outpatient  2-RSV bronchiolitis-stable  Continue supportive measures  3-sepsis secondary to 1 and 2--continue Levaquin for 2 more days     This is resolved    4-HIV infection--CD4 count of 186 CD4 to CD8 ratio 0 54  Patient recently started on [address]  Follows up with an ID specialist in [place] [address] for discharge home    Subjective:  Patient much improved  Still wheezing  Started on prednisone 40 mg with taper over 2 weeks  Patient to follow up with pulmonology on discharge  He follows up for his HIV with a provider in [place]  Patient is positive for respiratory syncytial virus  His CD4 absolute count is 186  A CD4 to CD8 ratio is 0 54  Patient was recently started on Biktarvy  Physical Exam:   Vitals: Blood pressure 146/96, pulse 82, temperature 98 8 °F (37 1 °C), temperature source Temporal, resp  rate 15, weight 110 kg (241 lb 10 oz), SpO2 95 %  ,There is no height or weight on file to calculate BMI  Gen:  Pleasant, non-tachypnic, non-dyspnic  Conversant  Heart: regular rate and rhythm, S1S2 present, no murmur, rub or gallop  Lungs:  Bilateral scattered rhonchi  Abd: soft, non-tender, non-distended   NABS, no guarding, rebound or peritoneal signs   Extremities: no clubbing, cyanosis or edema  2+pedal pulses bilaterally  Full range of motion  Neuro: awake, alert and oriented  Cranial nerves 2-12 intact  Strength and sensation grossly intact  Skin: warm and dry: no petechiae, purpura and rash      LABS:     Results from last 7 days  Lab Units 12/23/18  0539 12/22/18  1204 12/22/18  1033   WBC Thousand/uL 9 51  --  9 38   WHITE BLOOD CELL COUNT  x10E3/uL  --  10 3  --    HEMOGLOBIN g/dL 14 9  --  16 0   HEMOGLOBIN  g/dL  --  13 3  --    HEMATOCRIT % 47 9  --  50 4*   HEMATOCRIT  %  --  40 3  --    PLATELETS Thousands/uL 248  --  258   PLATELETS  J72M4/LI  --  264  --        Results from last 7 days  Lab Units 12/22/18  1033 12/20/18  1002   POTASSIUM mmol/L 3 9 4 1   CHLORIDE mmol/L 103 104   CO2 mmol/L 25 26   BUN mg/dL 15 10   CREATININE mg/dL 1 22 1 17   CALCIUM mg/dL 9 7 9 3     No intake or output data in the 24 hours ending 12/24/18 1149        Current Facility-Administered Medications:  acetaminophen 650 mg Oral Q6H PRN Madisyn Huff PA-C    albuterol 2 puff Inhalation Q2H PRN Scotty Sadler MD    benzonatate 100 mg Oral TID Madisyn Huff PA-C    calcium carbonate 1,000 mg Oral Daily PRN Madisyn Huff PA-C    diphenhydrAMINE 25 mg Intravenous Q6H [facility] Madisyn Huff PA-C    enoxaparin 40 mg Subcutaneous Daily Madisyn Huff PA-C    ipratropium-albuterol 3 mL Nebulization Q4H While Awake Scotty Sadler MD    ketorolac 30 mg Intravenous Q6H [facility] Madisyn Huff PA-C    levofloxacin 750 mg Intravenous Q24H Madisyn Huff PA-C Last Rate: 750 mg (12/23/18 1132)   metoclopramide 10 mg Intravenous Q6H [facility] Madisyn Huff PA-C    nicotine 1 patch Transdermal Daily Madisyn Huff PA-C    predniSONE 40 mg Oral Daily Scotty Sadler MD    sodium chloride 1,300 mL Intravenous Once -DO [place]

## 2018-12-24 NOTE — PROGRESS NOTES
Progress Note - Pulmonary   Shivam Jones 34 y o  male MRN: [id number]  Unit/Bed#: E5 -01 Encounter: [id number]      Assessment/Plan:  1  Severe persistent asthma with acute exacerbation        *  Prednisone started this morning  Would plan to give 50mg and taper by 10mg every 3 days        *  Continue DuoNeb Q6hrs        *  Levaquin for 2 more days per SLIM        *  Recently given [facility] as outpatient, which should be restarted at D/C  2  RSV         *  Supportive care  3  HIV        *  Recently started on Biktarvy        *  Follow up with ID in [place]    -Outpatient pulmonary follow up as per D/C instructions  Will need RAST/IgE once off steroids    Subjective:   Mr Marlon Duffy is seen laying in bed this morning  He is overall feeling better but still has some bronchospasm  He was started on prednisone this morning  He is hoping to be able to go home soon  He denies chest pain, resting shortness of breath, fever or hemoptysis  He does have occasional cough without significant sputum production  He does not routinely follow with a pulmonologist   He was recently given what he believes is [facility] as an outpatient but is yet to pick it up from the pharmacy  He over uses his rescue inhaler  Objective:     Vitals: Blood pressure 146/96, pulse 82, temperature 98 8 °F (37 1 °C), temperature source Temporal, resp  rate 15, weight 110 kg (241 lb 10 oz), SpO2 95 % , RA, There is no height or weight on file to calculate BMI  No intake or output data in the 24 hours ending 12/24/18 1159      Physical Exam  Gen: Awake, alert, oriented x 3, no acute distress  HEENT: Mucous membranes moist, no oral lesions, no thrush  NECK: No accessory muscle use, JVP not elevated  Cardiac: Regular, single S1, single S2, no murmurs, no rubs, no gallops  Lungs:  Decreased breath sounds throughout bilaterally  Scattered expiratory wheezes bilaterally  No rales or rhonchi noted    Abdomen:  Obese, normoactive bowel sounds, soft nontender, nondistended, no rebound or rigidity, no guarding  Extremities: no cyanosis, no clubbing, no edema    Labs: I have personally reviewed pertinent lab results  , ABG: No results found for: PHART, HGH7MPE, PO2ART, VUR0URM, S7CJFRLE, BEART, SOURCE, BNP: No results found for: BNP, CBC: No results found for: WBC, HGB, HCT, MCV, PLT, ADJUSTEDWBC, MCH, MCHC, RDW, MPV, NRBC, CMP: No results found for: SODIUM, K, CL, CO2, ANIONGAP, BUN, CREATININE, GLUCOSE, CALCIUM, AST, ALT, ALKPHOS, PROT, BILITOT, EGFR, PT/INR: No results found for: PT, INR, Troponin: No results found for: TROPONINI     Imaging and other studies: I have personally reviewed pertinent films in PACS    PA & Lat CXR 12/22/18  No acute cardiopulmonary disease  No consolidations or effusions      Js Chun PA-C

## 2018-12-27 LAB
BACTERIA BLD CULT: NORMAL
BACTERIA BLD CULT: NORMAL

## 2018-12-31 LAB
HIV1 RNA # SERPL NAA+PROBE: <20 COPIES/ML
HIV1 RNA SERPL NAA+PROBE-LOG#: NORMAL LOG10COPY/ML

## 2020-01-02 ENCOUNTER — OCCMED (OUTPATIENT)
Dept: URGENT CARE | Age: 40
End: 2020-01-02
Payer: OTHER MISCELLANEOUS

## 2020-01-02 ENCOUNTER — APPOINTMENT (OUTPATIENT)
Dept: RADIOLOGY | Age: 40
End: 2020-01-02
Payer: OTHER MISCELLANEOUS

## 2020-01-02 DIAGNOSIS — M54.50 ACUTE MIDLINE LOW BACK PAIN WITHOUT SCIATICA: Primary | ICD-10-CM

## 2020-01-02 DIAGNOSIS — M54.50 ACUTE MIDLINE LOW BACK PAIN WITHOUT SCIATICA: ICD-10-CM

## 2020-01-02 PROCEDURE — 99284 EMERGENCY DEPT VISIT MOD MDM: CPT | Performed by: FAMILY MEDICINE

## 2020-01-02 PROCEDURE — 72100 X-RAY EXAM L-S SPINE 2/3 VWS: CPT

## 2020-01-02 PROCEDURE — G0383 LEV 4 HOSP TYPE B ED VISIT: HCPCS | Performed by: FAMILY MEDICINE

## 2020-01-07 ENCOUNTER — APPOINTMENT (OUTPATIENT)
Dept: URGENT CARE | Age: 40
End: 2020-01-07
Payer: OTHER MISCELLANEOUS

## 2020-01-07 PROCEDURE — 99213 OFFICE O/P EST LOW 20 MIN: CPT | Performed by: PREVENTIVE MEDICINE

## 2020-01-14 ENCOUNTER — APPOINTMENT (OUTPATIENT)
Dept: URGENT CARE | Age: 40
End: 2020-01-14
Payer: OTHER MISCELLANEOUS

## 2020-01-14 PROCEDURE — 99213 OFFICE O/P EST LOW 20 MIN: CPT | Performed by: PREVENTIVE MEDICINE

## 2020-01-28 ENCOUNTER — APPOINTMENT (OUTPATIENT)
Dept: URGENT CARE | Age: 40
End: 2020-01-28
Payer: OTHER MISCELLANEOUS

## 2020-01-28 PROCEDURE — 99213 OFFICE O/P EST LOW 20 MIN: CPT | Performed by: PREVENTIVE MEDICINE

## 2020-05-29 ENCOUNTER — HOSPITAL ENCOUNTER (INPATIENT)
Facility: HOSPITAL | Age: 40
LOS: 3 days | Discharge: HOME | DRG: 720 | End: 2020-06-01
Attending: EMERGENCY MEDICINE | Admitting: INTERNAL MEDICINE
Payer: COMMERCIAL

## 2020-05-29 ENCOUNTER — APPOINTMENT (EMERGENCY)
Dept: RADIOLOGY | Facility: HOSPITAL | Age: 40
DRG: 720 | End: 2020-05-29
Payer: COMMERCIAL

## 2020-05-29 DIAGNOSIS — J45.51 SEVERE PERSISTENT ASTHMA WITH EXACERBATION: ICD-10-CM

## 2020-05-29 DIAGNOSIS — R00.0 TACHYCARDIA: ICD-10-CM

## 2020-05-29 DIAGNOSIS — J45.901 ASTHMA EXACERBATION: ICD-10-CM

## 2020-05-29 DIAGNOSIS — R06.2 WHEEZING: ICD-10-CM

## 2020-05-29 DIAGNOSIS — R50.9 FEVER: ICD-10-CM

## 2020-05-29 DIAGNOSIS — J12.9 VIRAL PNEUMONIA: Primary | ICD-10-CM

## 2020-05-29 DIAGNOSIS — R05.9 COUGH: ICD-10-CM

## 2020-05-29 DIAGNOSIS — A41.9 SEPSIS (HCC): ICD-10-CM

## 2020-05-29 DIAGNOSIS — R06.02 SHORTNESS OF BREATH: ICD-10-CM

## 2020-05-29 DIAGNOSIS — J21.0 RESPIRATORY SYNCYTIAL VIRUS (RSV) BRONCHIOLITIS: ICD-10-CM

## 2020-05-29 LAB
ALBUMIN SERPL BCP-MCNC: 3.6 G/DL (ref 3.5–5)
ALP SERPL-CCNC: 94 U/L (ref 46–116)
ALT SERPL W P-5'-P-CCNC: 21 U/L (ref 12–78)
ANION GAP SERPL CALCULATED.3IONS-SCNC: 12 MMOL/L (ref 4–13)
APTT PPP: 31 SECONDS (ref 23–37)
AST SERPL W P-5'-P-CCNC: 16 U/L (ref 5–45)
BASOPHILS # BLD AUTO: 0.03 THOUSANDS/ΜL (ref 0–0.1)
BASOPHILS NFR BLD AUTO: 0 % (ref 0–1)
BILIRUB SERPL-MCNC: 0.34 MG/DL (ref 0.2–1)
BUN SERPL-MCNC: 13 MG/DL (ref 5–25)
CALCIUM SERPL-MCNC: 8.7 MG/DL (ref 8.3–10.1)
CHLORIDE SERPL-SCNC: 102 MMOL/L (ref 100–108)
CO2 SERPL-SCNC: 25 MMOL/L (ref 21–32)
CREAT SERPL-MCNC: 1.48 MG/DL (ref 0.6–1.3)
EOSINOPHIL # BLD AUTO: 0.04 THOUSAND/ΜL (ref 0–0.61)
EOSINOPHIL NFR BLD AUTO: 0 % (ref 0–6)
ERYTHROCYTE [DISTWIDTH] IN BLOOD BY AUTOMATED COUNT: 14.1 % (ref 11.6–15.1)
GFR SERPL CREATININE-BSD FRML MDRD: 67 ML/MIN/1.73SQ M
GLUCOSE SERPL-MCNC: 104 MG/DL (ref 65–140)
HCT VFR BLD AUTO: 43.9 % (ref 36.5–49.3)
HGB BLD-MCNC: 14.5 G/DL (ref 12–17)
IMM GRANULOCYTES # BLD AUTO: 0.03 THOUSAND/UL (ref 0–0.2)
IMM GRANULOCYTES NFR BLD AUTO: 0 % (ref 0–2)
INR PPP: 0.87 (ref 0.84–1.19)
LACTATE SERPL-SCNC: 1 MMOL/L (ref 0.5–2)
LYMPHOCYTES # BLD AUTO: 2.09 THOUSANDS/ΜL (ref 0.6–4.47)
LYMPHOCYTES NFR BLD AUTO: 22 % (ref 14–44)
MCH RBC QN AUTO: 28 PG (ref 26.8–34.3)
MCHC RBC AUTO-ENTMCNC: 33 G/DL (ref 31.4–37.4)
MCV RBC AUTO: 85 FL (ref 82–98)
MONOCYTES # BLD AUTO: 1.33 THOUSAND/ΜL (ref 0.17–1.22)
MONOCYTES NFR BLD AUTO: 14 % (ref 4–12)
NEUTROPHILS # BLD AUTO: 5.85 THOUSANDS/ΜL (ref 1.85–7.62)
NEUTS SEG NFR BLD AUTO: 64 % (ref 43–75)
NRBC BLD AUTO-RTO: 0 /100 WBCS
PLATELET # BLD AUTO: 240 THOUSANDS/UL (ref 149–390)
PMV BLD AUTO: 9.3 FL (ref 8.9–12.7)
POTASSIUM SERPL-SCNC: 3.9 MMOL/L (ref 3.5–5.3)
PROT SERPL-MCNC: 7.1 G/DL (ref 6.4–8.2)
PROTHROMBIN TIME: 11.9 SECONDS (ref 11.6–14.5)
RBC # BLD AUTO: 5.17 MILLION/UL (ref 3.88–5.62)
SARS-COV-2 RNA RESP QL NAA+PROBE: NEGATIVE
SODIUM SERPL-SCNC: 139 MMOL/L (ref 136–145)
TROPONIN I SERPL-MCNC: <0.02 NG/ML
WBC # BLD AUTO: 9.37 THOUSAND/UL (ref 4.31–10.16)

## 2020-05-29 PROCEDURE — 99285 EMERGENCY DEPT VISIT HI MDM: CPT

## 2020-05-29 PROCEDURE — 36415 COLL VENOUS BLD VENIPUNCTURE: CPT | Performed by: EMERGENCY MEDICINE

## 2020-05-29 PROCEDURE — 94640 AIRWAY INHALATION TREATMENT: CPT | Performed by: EMERGENCY MEDICINE

## 2020-05-29 PROCEDURE — 93005 ELECTROCARDIOGRAM TRACING: CPT

## 2020-05-29 PROCEDURE — 96367 TX/PROPH/DG ADDL SEQ IV INF: CPT

## 2020-05-29 PROCEDURE — 96365 THER/PROPH/DIAG IV INF INIT: CPT

## 2020-05-29 PROCEDURE — 85730 THROMBOPLASTIN TIME PARTIAL: CPT | Performed by: EMERGENCY MEDICINE

## 2020-05-29 PROCEDURE — 83605 ASSAY OF LACTIC ACID: CPT | Performed by: EMERGENCY MEDICINE

## 2020-05-29 PROCEDURE — 87635 SARS-COV-2 COVID-19 AMP PRB: CPT | Performed by: EMERGENCY MEDICINE

## 2020-05-29 PROCEDURE — 94640 AIRWAY INHALATION TREATMENT: CPT

## 2020-05-29 PROCEDURE — 85025 COMPLETE CBC W/AUTO DIFF WBC: CPT | Performed by: EMERGENCY MEDICINE

## 2020-05-29 PROCEDURE — 87040 BLOOD CULTURE FOR BACTERIA: CPT | Performed by: EMERGENCY MEDICINE

## 2020-05-29 PROCEDURE — 80053 COMPREHEN METABOLIC PANEL: CPT | Performed by: EMERGENCY MEDICINE

## 2020-05-29 PROCEDURE — 84484 ASSAY OF TROPONIN QUANT: CPT | Performed by: EMERGENCY MEDICINE

## 2020-05-29 PROCEDURE — 84145 PROCALCITONIN (PCT): CPT | Performed by: EMERGENCY MEDICINE

## 2020-05-29 PROCEDURE — 85610 PROTHROMBIN TIME: CPT | Performed by: EMERGENCY MEDICINE

## 2020-05-29 PROCEDURE — 71045 X-RAY EXAM CHEST 1 VIEW: CPT

## 2020-05-29 PROCEDURE — 99285 EMERGENCY DEPT VISIT HI MDM: CPT | Performed by: EMERGENCY MEDICINE

## 2020-05-29 RX ORDER — ACETAMINOPHEN 325 MG/1
650 TABLET ORAL ONCE
Status: COMPLETED | OUTPATIENT
Start: 2020-05-29 | End: 2020-05-29

## 2020-05-29 RX ORDER — MAGNESIUM SULFATE 1 G/100ML
1 INJECTION INTRAVENOUS ONCE
Status: COMPLETED | OUTPATIENT
Start: 2020-05-29 | End: 2020-05-30

## 2020-05-29 RX ORDER — SODIUM CHLORIDE FOR INHALATION 0.9 %
3 VIAL, NEBULIZER (ML) INHALATION ONCE
Status: COMPLETED | OUTPATIENT
Start: 2020-05-29 | End: 2020-05-29

## 2020-05-29 RX ORDER — IBUPROFEN 600 MG/1
600 TABLET ORAL ONCE
Status: COMPLETED | OUTPATIENT
Start: 2020-05-29 | End: 2020-05-29

## 2020-05-29 RX ORDER — METHYLPREDNISOLONE SODIUM SUCCINATE 125 MG/2ML
125 INJECTION, POWDER, LYOPHILIZED, FOR SOLUTION INTRAMUSCULAR; INTRAVENOUS ONCE
Status: COMPLETED | OUTPATIENT
Start: 2020-05-29 | End: 2020-05-29

## 2020-05-29 RX ORDER — LOSARTAN POTASSIUM 50 MG/1
50 TABLET ORAL DAILY
COMMUNITY
Start: 2020-01-30 | End: 2021-01-29

## 2020-05-29 RX ORDER — BUDESONIDE AND FORMOTEROL FUMARATE DIHYDRATE 160; 4.5 UG/1; UG/1
2 AEROSOL RESPIRATORY (INHALATION) 2 TIMES DAILY
COMMUNITY
Start: 2019-12-19 | End: 2021-01-21 | Stop reason: SDUPTHER

## 2020-05-29 RX ADMIN — ALBUTEROL SULFATE 5 MG: 2.5 SOLUTION RESPIRATORY (INHALATION) at 21:50

## 2020-05-29 RX ADMIN — CEFTRIAXONE SODIUM 1000 MG: 10 INJECTION, POWDER, FOR SOLUTION INTRAVENOUS at 21:48

## 2020-05-29 RX ADMIN — IPRATROPIUM BROMIDE 0.5 MG: 0.5 SOLUTION RESPIRATORY (INHALATION) at 21:50

## 2020-05-29 RX ADMIN — ISODIUM CHLORIDE 3 ML: 0.03 SOLUTION RESPIRATORY (INHALATION) at 21:50

## 2020-05-29 RX ADMIN — ACETAMINOPHEN 650 MG: 325 TABLET, FILM COATED ORAL at 21:46

## 2020-05-29 RX ADMIN — DOXYCYCLINE 100 MG: 100 INJECTION, POWDER, LYOPHILIZED, FOR SOLUTION INTRAVENOUS at 22:29

## 2020-05-29 RX ADMIN — IBUPROFEN 600 MG: 600 TABLET ORAL at 21:46

## 2020-05-29 RX ADMIN — METHYLPREDNISOLONE SODIUM SUCCINATE 125 MG: 125 INJECTION, POWDER, FOR SOLUTION INTRAMUSCULAR; INTRAVENOUS at 23:33

## 2020-05-30 PROBLEM — N18.2 CHRONIC KIDNEY DISEASE (CKD) STAGE G2/A2, MILDLY DECREASED GLOMERULAR FILTRATION RATE (GFR) BETWEEN 60-89 ML/MIN/1.73 SQUARE METER AND ALBUMINURIA CREATININE RATIO BETWEEN 30-299 MG/G: Status: ACTIVE | Noted: 2020-05-30

## 2020-05-30 PROBLEM — R65.10 SIRS (SYSTEMIC INFLAMMATORY RESPONSE SYNDROME) (HCC): Status: ACTIVE | Noted: 2020-05-30

## 2020-05-30 PROBLEM — Z72.0 TOBACCO ABUSE: Status: ACTIVE | Noted: 2020-05-30

## 2020-05-30 PROBLEM — R79.89 CREATININE ELEVATION: Status: ACTIVE | Noted: 2020-05-30

## 2020-05-30 LAB
ALBUMIN SERPL BCP-MCNC: 3.5 G/DL (ref 3.5–5)
ALP SERPL-CCNC: 84 U/L (ref 46–116)
ALT SERPL W P-5'-P-CCNC: 21 U/L (ref 12–78)
ANION GAP SERPL CALCULATED.3IONS-SCNC: 6 MMOL/L (ref 4–13)
AST SERPL W P-5'-P-CCNC: 13 U/L (ref 5–45)
ATRIAL RATE: 124 BPM
BASOPHILS # BLD AUTO: 0.02 THOUSANDS/ΜL (ref 0–0.1)
BASOPHILS NFR BLD AUTO: 0 % (ref 0–1)
BILIRUB SERPL-MCNC: 0.27 MG/DL (ref 0.2–1)
BUN SERPL-MCNC: 10 MG/DL (ref 5–25)
CALCIUM SERPL-MCNC: 8.9 MG/DL (ref 8.3–10.1)
CHLORIDE SERPL-SCNC: 105 MMOL/L (ref 100–108)
CO2 SERPL-SCNC: 28 MMOL/L (ref 21–32)
CREAT SERPL-MCNC: 1.4 MG/DL (ref 0.6–1.3)
EOSINOPHIL # BLD AUTO: 0 THOUSAND/ΜL (ref 0–0.61)
EOSINOPHIL NFR BLD AUTO: 0 % (ref 0–6)
ERYTHROCYTE [DISTWIDTH] IN BLOOD BY AUTOMATED COUNT: 14.3 % (ref 11.6–15.1)
GFR SERPL CREATININE-BSD FRML MDRD: 72 ML/MIN/1.73SQ M
GLUCOSE SERPL-MCNC: 153 MG/DL (ref 65–140)
HCT VFR BLD AUTO: 45.6 % (ref 36.5–49.3)
HGB BLD-MCNC: 14.6 G/DL (ref 12–17)
IMM GRANULOCYTES # BLD AUTO: 0.02 THOUSAND/UL (ref 0–0.2)
IMM GRANULOCYTES NFR BLD AUTO: 0 % (ref 0–2)
LYMPHOCYTES # BLD AUTO: 0.76 THOUSANDS/ΜL (ref 0.6–4.47)
LYMPHOCYTES NFR BLD AUTO: 11 % (ref 14–44)
MAGNESIUM SERPL-MCNC: 2.9 MG/DL (ref 1.6–2.6)
MCH RBC QN AUTO: 27.6 PG (ref 26.8–34.3)
MCHC RBC AUTO-ENTMCNC: 32 G/DL (ref 31.4–37.4)
MCV RBC AUTO: 86 FL (ref 82–98)
MONOCYTES # BLD AUTO: 0.19 THOUSAND/ΜL (ref 0.17–1.22)
MONOCYTES NFR BLD AUTO: 3 % (ref 4–12)
NEUTROPHILS # BLD AUTO: 6.12 THOUSANDS/ΜL (ref 1.85–7.62)
NEUTS SEG NFR BLD AUTO: 86 % (ref 43–75)
NRBC BLD AUTO-RTO: 0 /100 WBCS
P AXIS: 72 DEGREES
PHOSPHATE SERPL-MCNC: 2.6 MG/DL (ref 2.7–4.5)
PLATELET # BLD AUTO: 231 THOUSANDS/UL (ref 149–390)
PMV BLD AUTO: 9.4 FL (ref 8.9–12.7)
POTASSIUM SERPL-SCNC: 4.1 MMOL/L (ref 3.5–5.3)
PR INTERVAL: 136 MS
PROCALCITONIN SERPL-MCNC: 0.06 NG/ML
PROT SERPL-MCNC: 7.8 G/DL (ref 6.4–8.2)
QRS AXIS: 65 DEGREES
QRSD INTERVAL: 84 MS
QT INTERVAL: 298 MS
QTC INTERVAL: 428 MS
RBC # BLD AUTO: 5.29 MILLION/UL (ref 3.88–5.62)
SODIUM SERPL-SCNC: 139 MMOL/L (ref 136–145)
T WAVE AXIS: 38 DEGREES
VENTRICULAR RATE: 124 BPM
WBC # BLD AUTO: 7.11 THOUSAND/UL (ref 4.31–10.16)

## 2020-05-30 PROCEDURE — 83735 ASSAY OF MAGNESIUM: CPT | Performed by: PHYSICIAN ASSISTANT

## 2020-05-30 PROCEDURE — 85025 COMPLETE CBC W/AUTO DIFF WBC: CPT | Performed by: PHYSICIAN ASSISTANT

## 2020-05-30 PROCEDURE — 93010 ELECTROCARDIOGRAM REPORT: CPT | Performed by: INTERNAL MEDICINE

## 2020-05-30 PROCEDURE — 99223 1ST HOSP IP/OBS HIGH 75: CPT | Performed by: INTERNAL MEDICINE

## 2020-05-30 PROCEDURE — 94640 AIRWAY INHALATION TREATMENT: CPT

## 2020-05-30 PROCEDURE — 80053 COMPREHEN METABOLIC PANEL: CPT | Performed by: PHYSICIAN ASSISTANT

## 2020-05-30 PROCEDURE — 84100 ASSAY OF PHOSPHORUS: CPT | Performed by: PHYSICIAN ASSISTANT

## 2020-05-30 PROCEDURE — 99232 SBSQ HOSP IP/OBS MODERATE 35: CPT | Performed by: INTERNAL MEDICINE

## 2020-05-30 PROCEDURE — 94760 N-INVAS EAR/PLS OXIMETRY 1: CPT

## 2020-05-30 RX ORDER — LEVALBUTEROL 1.25 MG/.5ML
1.25 SOLUTION, CONCENTRATE RESPIRATORY (INHALATION)
Status: DISCONTINUED | OUTPATIENT
Start: 2020-05-30 | End: 2020-06-01 | Stop reason: HOSPADM

## 2020-05-30 RX ORDER — ONDANSETRON 2 MG/ML
4 INJECTION INTRAMUSCULAR; INTRAVENOUS EVERY 6 HOURS PRN
Status: DISCONTINUED | OUTPATIENT
Start: 2020-05-30 | End: 2020-06-01 | Stop reason: HOSPADM

## 2020-05-30 RX ORDER — METHYLPREDNISOLONE SODIUM SUCCINATE 40 MG/ML
40 INJECTION, POWDER, LYOPHILIZED, FOR SOLUTION INTRAMUSCULAR; INTRAVENOUS EVERY 12 HOURS SCHEDULED
Status: DISCONTINUED | OUTPATIENT
Start: 2020-05-30 | End: 2020-05-31

## 2020-05-30 RX ORDER — HEPARIN SODIUM 5000 [USP'U]/ML
5000 INJECTION, SOLUTION INTRAVENOUS; SUBCUTANEOUS EVERY 8 HOURS SCHEDULED
Status: DISCONTINUED | OUTPATIENT
Start: 2020-05-30 | End: 2020-06-01 | Stop reason: HOSPADM

## 2020-05-30 RX ORDER — AZITHROMYCIN 250 MG/1
500 TABLET, FILM COATED ORAL EVERY 24 HOURS
Status: DISCONTINUED | OUTPATIENT
Start: 2020-05-30 | End: 2020-06-01 | Stop reason: HOSPADM

## 2020-05-30 RX ORDER — ALBUTEROL SULFATE 90 UG/1
2 AEROSOL, METERED RESPIRATORY (INHALATION) EVERY 4 HOURS PRN
Status: DISCONTINUED | OUTPATIENT
Start: 2020-05-30 | End: 2020-06-01 | Stop reason: HOSPADM

## 2020-05-30 RX ORDER — DOCUSATE SODIUM 100 MG/1
100 CAPSULE, LIQUID FILLED ORAL 2 TIMES DAILY
Status: DISCONTINUED | OUTPATIENT
Start: 2020-05-30 | End: 2020-06-01 | Stop reason: HOSPADM

## 2020-05-30 RX ORDER — SODIUM CHLORIDE 9 MG/ML
75 INJECTION, SOLUTION INTRAVENOUS CONTINUOUS
Status: DISCONTINUED | OUTPATIENT
Start: 2020-05-30 | End: 2020-05-31

## 2020-05-30 RX ORDER — ACETAMINOPHEN 325 MG/1
650 TABLET ORAL EVERY 6 HOURS PRN
Status: DISCONTINUED | OUTPATIENT
Start: 2020-05-30 | End: 2020-06-01 | Stop reason: HOSPADM

## 2020-05-30 RX ORDER — BUDESONIDE AND FORMOTEROL FUMARATE DIHYDRATE 160; 4.5 UG/1; UG/1
2 AEROSOL RESPIRATORY (INHALATION) 2 TIMES DAILY
Status: DISCONTINUED | OUTPATIENT
Start: 2020-05-30 | End: 2020-06-01 | Stop reason: HOSPADM

## 2020-05-30 RX ORDER — SODIUM CHLORIDE FOR INHALATION 0.9 %
3 VIAL, NEBULIZER (ML) INHALATION
Status: DISCONTINUED | OUTPATIENT
Start: 2020-05-30 | End: 2020-05-30

## 2020-05-30 RX ORDER — OXYCODONE HYDROCHLORIDE 5 MG/1
5 TABLET ORAL EVERY 6 HOURS PRN
Status: DISCONTINUED | OUTPATIENT
Start: 2020-05-30 | End: 2020-06-01 | Stop reason: HOSPADM

## 2020-05-30 RX ORDER — LOSARTAN POTASSIUM 50 MG/1
50 TABLET ORAL DAILY
Status: DISCONTINUED | OUTPATIENT
Start: 2020-05-30 | End: 2020-05-30

## 2020-05-30 RX ORDER — BENZONATATE 100 MG/1
100 CAPSULE ORAL 3 TIMES DAILY
Status: DISCONTINUED | OUTPATIENT
Start: 2020-05-30 | End: 2020-05-31

## 2020-05-30 RX ADMIN — BICTEGRAVIR SODIUM, EMTRICITABINE, AND TENOFOVIR ALAFENAMIDE FUMARATE 1 TABLET: 50; 200; 25 TABLET ORAL at 08:41

## 2020-05-30 RX ADMIN — AZITHROMYCIN 500 MG: 250 TABLET, FILM COATED ORAL at 15:16

## 2020-05-30 RX ADMIN — IPRATROPIUM BROMIDE 0.5 MG: 0.5 SOLUTION RESPIRATORY (INHALATION) at 19:35

## 2020-05-30 RX ADMIN — NICOTINE 1 PATCH: 7 PATCH TRANSDERMAL at 08:41

## 2020-05-30 RX ADMIN — HEPARIN SODIUM 5000 UNITS: 5000 INJECTION INTRAVENOUS; SUBCUTANEOUS at 21:18

## 2020-05-30 RX ADMIN — CEFTRIAXONE 1000 MG: 1 INJECTION, POWDER, FOR SOLUTION INTRAMUSCULAR; INTRAVENOUS at 15:15

## 2020-05-30 RX ADMIN — DOCUSATE SODIUM 100 MG: 100 CAPSULE, LIQUID FILLED ORAL at 17:37

## 2020-05-30 RX ADMIN — ISODIUM CHLORIDE 3 ML: 0.03 SOLUTION RESPIRATORY (INHALATION) at 08:03

## 2020-05-30 RX ADMIN — LEVALBUTEROL 1.25 MG: 1.25 SOLUTION, CONCENTRATE RESPIRATORY (INHALATION) at 19:35

## 2020-05-30 RX ADMIN — LEVALBUTEROL 1.25 MG: 1.25 SOLUTION, CONCENTRATE RESPIRATORY (INHALATION) at 08:03

## 2020-05-30 RX ADMIN — HEPARIN SODIUM 5000 UNITS: 5000 INJECTION INTRAVENOUS; SUBCUTANEOUS at 01:00

## 2020-05-30 RX ADMIN — IPRATROPIUM BROMIDE 0.5 MG: 0.5 SOLUTION RESPIRATORY (INHALATION) at 13:00

## 2020-05-30 RX ADMIN — MAGNESIUM SULFATE HEPTAHYDRATE 1 G: 1 INJECTION, SOLUTION INTRAVENOUS at 00:09

## 2020-05-30 RX ADMIN — METHYLPREDNISOLONE SODIUM SUCCINATE 40 MG: 40 INJECTION, POWDER, FOR SOLUTION INTRAMUSCULAR; INTRAVENOUS at 17:37

## 2020-05-30 RX ADMIN — HEPARIN SODIUM 5000 UNITS: 5000 INJECTION INTRAVENOUS; SUBCUTANEOUS at 05:37

## 2020-05-30 RX ADMIN — HEPARIN SODIUM 5000 UNITS: 5000 INJECTION INTRAVENOUS; SUBCUTANEOUS at 15:15

## 2020-05-30 RX ADMIN — BENZONATATE 100 MG: 100 CAPSULE ORAL at 08:41

## 2020-05-30 RX ADMIN — LEVALBUTEROL 1.25 MG: 1.25 SOLUTION, CONCENTRATE RESPIRATORY (INHALATION) at 13:00

## 2020-05-30 RX ADMIN — BUDESONIDE AND FORMOTEROL FUMARATE DIHYDRATE 2 PUFF: 160; 4.5 AEROSOL RESPIRATORY (INHALATION) at 17:37

## 2020-05-30 RX ADMIN — BENZONATATE 100 MG: 100 CAPSULE ORAL at 15:15

## 2020-05-30 RX ADMIN — SODIUM CHLORIDE 75 ML/HR: 0.9 INJECTION, SOLUTION INTRAVENOUS at 17:37

## 2020-05-30 RX ADMIN — BUDESONIDE AND FORMOTEROL FUMARATE DIHYDRATE 2 PUFF: 160; 4.5 AEROSOL RESPIRATORY (INHALATION) at 08:40

## 2020-05-30 RX ADMIN — LOSARTAN POTASSIUM 50 MG: 50 TABLET, FILM COATED ORAL at 08:41

## 2020-05-30 RX ADMIN — DOCUSATE SODIUM 100 MG: 100 CAPSULE, LIQUID FILLED ORAL at 08:41

## 2020-05-30 RX ADMIN — BENZONATATE 100 MG: 100 CAPSULE ORAL at 21:18

## 2020-05-31 LAB
ANION GAP SERPL CALCULATED.3IONS-SCNC: 5 MMOL/L (ref 4–13)
BASOPHILS # BLD AUTO: 0.01 THOUSANDS/ΜL (ref 0–0.1)
BASOPHILS NFR BLD AUTO: 0 % (ref 0–1)
BUN SERPL-MCNC: 10 MG/DL (ref 5–25)
CALCIUM SERPL-MCNC: 8.6 MG/DL (ref 8.3–10.1)
CHLORIDE SERPL-SCNC: 109 MMOL/L (ref 100–108)
CO2 SERPL-SCNC: 28 MMOL/L (ref 21–32)
CREAT SERPL-MCNC: 1.16 MG/DL (ref 0.6–1.3)
EOSINOPHIL # BLD AUTO: 0 THOUSAND/ΜL (ref 0–0.61)
EOSINOPHIL NFR BLD AUTO: 0 % (ref 0–6)
ERYTHROCYTE [DISTWIDTH] IN BLOOD BY AUTOMATED COUNT: 14.6 % (ref 11.6–15.1)
GFR SERPL CREATININE-BSD FRML MDRD: 90 ML/MIN/1.73SQ M
GLUCOSE SERPL-MCNC: 165 MG/DL (ref 65–140)
HCT VFR BLD AUTO: 42.3 % (ref 36.5–49.3)
HGB BLD-MCNC: 13.5 G/DL (ref 12–17)
IMM GRANULOCYTES # BLD AUTO: 0.07 THOUSAND/UL (ref 0–0.2)
IMM GRANULOCYTES NFR BLD AUTO: 1 % (ref 0–2)
LYMPHOCYTES # BLD AUTO: 1.35 THOUSANDS/ΜL (ref 0.6–4.47)
LYMPHOCYTES NFR BLD AUTO: 11 % (ref 14–44)
MCH RBC QN AUTO: 27.8 PG (ref 26.8–34.3)
MCHC RBC AUTO-ENTMCNC: 31.9 G/DL (ref 31.4–37.4)
MCV RBC AUTO: 87 FL (ref 82–98)
MONOCYTES # BLD AUTO: 0.95 THOUSAND/ΜL (ref 0.17–1.22)
MONOCYTES NFR BLD AUTO: 8 % (ref 4–12)
NEUTROPHILS # BLD AUTO: 9.6 THOUSANDS/ΜL (ref 1.85–7.62)
NEUTS SEG NFR BLD AUTO: 80 % (ref 43–75)
NRBC BLD AUTO-RTO: 0 /100 WBCS
PLATELET # BLD AUTO: 230 THOUSANDS/UL (ref 149–390)
PMV BLD AUTO: 9.4 FL (ref 8.9–12.7)
POTASSIUM SERPL-SCNC: 3.8 MMOL/L (ref 3.5–5.3)
PROCALCITONIN SERPL-MCNC: <0.05 NG/ML
RBC # BLD AUTO: 4.85 MILLION/UL (ref 3.88–5.62)
SODIUM SERPL-SCNC: 142 MMOL/L (ref 136–145)
WBC # BLD AUTO: 11.98 THOUSAND/UL (ref 4.31–10.16)

## 2020-05-31 PROCEDURE — 85025 COMPLETE CBC W/AUTO DIFF WBC: CPT | Performed by: INTERNAL MEDICINE

## 2020-05-31 PROCEDURE — 94640 AIRWAY INHALATION TREATMENT: CPT

## 2020-05-31 PROCEDURE — 99232 SBSQ HOSP IP/OBS MODERATE 35: CPT | Performed by: INTERNAL MEDICINE

## 2020-05-31 PROCEDURE — 87081 CULTURE SCREEN ONLY: CPT | Performed by: PHYSICIAN ASSISTANT

## 2020-05-31 PROCEDURE — 94760 N-INVAS EAR/PLS OXIMETRY 1: CPT

## 2020-05-31 PROCEDURE — 80048 BASIC METABOLIC PNL TOTAL CA: CPT | Performed by: INTERNAL MEDICINE

## 2020-05-31 PROCEDURE — 84145 PROCALCITONIN (PCT): CPT | Performed by: PHYSICIAN ASSISTANT

## 2020-05-31 RX ORDER — PREDNISONE 20 MG/1
40 TABLET ORAL DAILY
Status: DISCONTINUED | OUTPATIENT
Start: 2020-05-31 | End: 2020-06-01 | Stop reason: HOSPADM

## 2020-05-31 RX ORDER — GUAIFENESIN/DEXTROMETHORPHAN 100-10MG/5
10 SYRUP ORAL EVERY 6 HOURS SCHEDULED
Status: DISCONTINUED | OUTPATIENT
Start: 2020-05-31 | End: 2020-06-01 | Stop reason: HOSPADM

## 2020-05-31 RX ADMIN — BICTEGRAVIR SODIUM, EMTRICITABINE, AND TENOFOVIR ALAFENAMIDE FUMARATE 1 TABLET: 50; 200; 25 TABLET ORAL at 08:15

## 2020-05-31 RX ADMIN — HEPARIN SODIUM 5000 UNITS: 5000 INJECTION INTRAVENOUS; SUBCUTANEOUS at 04:55

## 2020-05-31 RX ADMIN — LEVALBUTEROL 1.25 MG: 1.25 SOLUTION, CONCENTRATE RESPIRATORY (INHALATION) at 13:45

## 2020-05-31 RX ADMIN — CEFTRIAXONE 1000 MG: 1 INJECTION, POWDER, FOR SOLUTION INTRAMUSCULAR; INTRAVENOUS at 13:59

## 2020-05-31 RX ADMIN — BUDESONIDE AND FORMOTEROL FUMARATE DIHYDRATE 2 PUFF: 160; 4.5 AEROSOL RESPIRATORY (INHALATION) at 21:07

## 2020-05-31 RX ADMIN — BENZONATATE 100 MG: 100 CAPSULE ORAL at 08:15

## 2020-05-31 RX ADMIN — IPRATROPIUM BROMIDE 0.5 MG: 0.5 SOLUTION RESPIRATORY (INHALATION) at 13:45

## 2020-05-31 RX ADMIN — GUAIFENESIN AND DEXTROMETHORPHAN 10 ML: 100; 10 SYRUP ORAL at 13:59

## 2020-05-31 RX ADMIN — AZITHROMYCIN 500 MG: 250 TABLET, FILM COATED ORAL at 15:47

## 2020-05-31 RX ADMIN — BUDESONIDE AND FORMOTEROL FUMARATE DIHYDRATE 2 PUFF: 160; 4.5 AEROSOL RESPIRATORY (INHALATION) at 08:13

## 2020-05-31 RX ADMIN — LEVALBUTEROL 1.25 MG: 1.25 SOLUTION, CONCENTRATE RESPIRATORY (INHALATION) at 18:55

## 2020-05-31 RX ADMIN — OXYCODONE HYDROCHLORIDE 5 MG: 5 TABLET ORAL at 21:24

## 2020-05-31 RX ADMIN — HEPARIN SODIUM 5000 UNITS: 5000 INJECTION INTRAVENOUS; SUBCUTANEOUS at 13:59

## 2020-05-31 RX ADMIN — LEVALBUTEROL 1.25 MG: 1.25 SOLUTION, CONCENTRATE RESPIRATORY (INHALATION) at 08:13

## 2020-05-31 RX ADMIN — IPRATROPIUM BROMIDE 0.5 MG: 0.5 SOLUTION RESPIRATORY (INHALATION) at 08:13

## 2020-05-31 RX ADMIN — GUAIFENESIN AND DEXTROMETHORPHAN 10 ML: 100; 10 SYRUP ORAL at 17:33

## 2020-05-31 RX ADMIN — NICOTINE 1 PATCH: 7 PATCH TRANSDERMAL at 08:16

## 2020-05-31 RX ADMIN — IPRATROPIUM BROMIDE 0.5 MG: 0.5 SOLUTION RESPIRATORY (INHALATION) at 18:55

## 2020-05-31 RX ADMIN — HEPARIN SODIUM 5000 UNITS: 5000 INJECTION INTRAVENOUS; SUBCUTANEOUS at 21:07

## 2020-05-31 RX ADMIN — PREDNISONE 40 MG: 20 TABLET ORAL at 09:30

## 2020-06-01 VITALS
HEART RATE: 84 BPM | OXYGEN SATURATION: 94 % | RESPIRATION RATE: 18 BRPM | TEMPERATURE: 97.6 F | SYSTOLIC BLOOD PRESSURE: 154 MMHG | WEIGHT: 263.67 LBS | DIASTOLIC BLOOD PRESSURE: 84 MMHG

## 2020-06-01 PROBLEM — R65.10 SIRS (SYSTEMIC INFLAMMATORY RESPONSE SYNDROME) (HCC): Status: RESOLVED | Noted: 2020-05-30 | Resolved: 2020-06-01

## 2020-06-01 LAB
ANION GAP SERPL CALCULATED.3IONS-SCNC: 8 MMOL/L (ref 4–13)
BACTERIA UR QL AUTO: ABNORMAL /HPF
BASOPHILS # BLD AUTO: 0.02 THOUSANDS/ΜL (ref 0–0.1)
BASOPHILS NFR BLD AUTO: 0 % (ref 0–1)
BILIRUB UR QL STRIP: NEGATIVE
BUN SERPL-MCNC: 9 MG/DL (ref 5–25)
CALCIUM SERPL-MCNC: 8.6 MG/DL (ref 8.3–10.1)
CHLORIDE SERPL-SCNC: 109 MMOL/L (ref 100–108)
CLARITY UR: CLEAR
CO2 SERPL-SCNC: 26 MMOL/L (ref 21–32)
COLOR UR: ABNORMAL
CREAT SERPL-MCNC: 1.03 MG/DL (ref 0.6–1.3)
EOSINOPHIL # BLD AUTO: 0.01 THOUSAND/ΜL (ref 0–0.61)
EOSINOPHIL NFR BLD AUTO: 0 % (ref 0–6)
ERYTHROCYTE [DISTWIDTH] IN BLOOD BY AUTOMATED COUNT: 14.8 % (ref 11.6–15.1)
GFR SERPL CREATININE-BSD FRML MDRD: 104 ML/MIN/1.73SQ M
GLUCOSE SERPL-MCNC: 103 MG/DL (ref 65–140)
GLUCOSE UR STRIP-MCNC: NEGATIVE MG/DL
HCT VFR BLD AUTO: 42.4 % (ref 36.5–49.3)
HGB BLD-MCNC: 13.6 G/DL (ref 12–17)
HGB UR QL STRIP.AUTO: ABNORMAL
IMM GRANULOCYTES # BLD AUTO: 0.08 THOUSAND/UL (ref 0–0.2)
IMM GRANULOCYTES NFR BLD AUTO: 1 % (ref 0–2)
KETONES UR STRIP-MCNC: NEGATIVE MG/DL
LEUKOCYTE ESTERASE UR QL STRIP: NEGATIVE
LYMPHOCYTES # BLD AUTO: 2.31 THOUSANDS/ΜL (ref 0.6–4.47)
LYMPHOCYTES NFR BLD AUTO: 25 % (ref 14–44)
MCH RBC QN AUTO: 28 PG (ref 26.8–34.3)
MCHC RBC AUTO-ENTMCNC: 32.1 G/DL (ref 31.4–37.4)
MCV RBC AUTO: 87 FL (ref 82–98)
MONOCYTES # BLD AUTO: 0.64 THOUSAND/ΜL (ref 0.17–1.22)
MONOCYTES NFR BLD AUTO: 7 % (ref 4–12)
NEUTROPHILS # BLD AUTO: 6.22 THOUSANDS/ΜL (ref 1.85–7.62)
NEUTS SEG NFR BLD AUTO: 67 % (ref 43–75)
NITRITE UR QL STRIP: NEGATIVE
NON-SQ EPI CELLS URNS QL MICRO: ABNORMAL /HPF
NRBC BLD AUTO-RTO: 0 /100 WBCS
PH UR STRIP.AUTO: 6 [PH]
PLATELET # BLD AUTO: 220 THOUSANDS/UL (ref 149–390)
PMV BLD AUTO: 9.1 FL (ref 8.9–12.7)
POTASSIUM SERPL-SCNC: 3.6 MMOL/L (ref 3.5–5.3)
PROT UR STRIP-MCNC: NEGATIVE MG/DL
RBC # BLD AUTO: 4.86 MILLION/UL (ref 3.88–5.62)
RBC #/AREA URNS AUTO: ABNORMAL /HPF
SODIUM SERPL-SCNC: 143 MMOL/L (ref 136–145)
SP GR UR STRIP.AUTO: 1.02 (ref 1–1.03)
UROBILINOGEN UR QL STRIP.AUTO: 0.2 E.U./DL
WBC # BLD AUTO: 9.28 THOUSAND/UL (ref 4.31–10.16)
WBC #/AREA URNS AUTO: ABNORMAL /HPF

## 2020-06-01 PROCEDURE — 85025 COMPLETE CBC W/AUTO DIFF WBC: CPT | Performed by: NURSE PRACTITIONER

## 2020-06-01 PROCEDURE — 94760 N-INVAS EAR/PLS OXIMETRY 1: CPT

## 2020-06-01 PROCEDURE — 94640 AIRWAY INHALATION TREATMENT: CPT

## 2020-06-01 PROCEDURE — 81001 URINALYSIS AUTO W/SCOPE: CPT | Performed by: PHYSICIAN ASSISTANT

## 2020-06-01 PROCEDURE — 99239 HOSP IP/OBS DSCHRG MGMT >30: CPT | Performed by: PHYSICIAN ASSISTANT

## 2020-06-01 PROCEDURE — 80048 BASIC METABOLIC PNL TOTAL CA: CPT | Performed by: NURSE PRACTITIONER

## 2020-06-01 RX ORDER — AZITHROMYCIN 500 MG/1
250 TABLET, FILM COATED ORAL EVERY 24 HOURS
Qty: 1 TABLET | Refills: 0 | Status: SHIPPED | OUTPATIENT
Start: 2020-06-01 | End: 2020-06-01

## 2020-06-01 RX ORDER — PREDNISONE 10 MG/1
TABLET ORAL
Qty: 30 TABLET | Refills: 0 | Status: ON HOLD | OUTPATIENT
Start: 2020-06-01 | End: 2020-12-12 | Stop reason: SDUPTHER

## 2020-06-01 RX ORDER — BENZONATATE 100 MG/1
100 CAPSULE ORAL 3 TIMES DAILY
Qty: 20 CAPSULE | Refills: 0 | Status: SHIPPED | OUTPATIENT
Start: 2020-06-01 | End: 2020-06-01 | Stop reason: SDUPTHER

## 2020-06-01 RX ORDER — PREDNISONE 10 MG/1
TABLET ORAL
Qty: 30 TABLET | Refills: 0 | Status: SHIPPED | OUTPATIENT
Start: 2020-06-01 | End: 2020-06-01 | Stop reason: SDUPTHER

## 2020-06-01 RX ORDER — AZITHROMYCIN 500 MG/1
250 TABLET, FILM COATED ORAL EVERY 24 HOURS
Qty: 1 TABLET | Refills: 0 | Status: SHIPPED | OUTPATIENT
Start: 2020-06-01 | End: 2020-06-02

## 2020-06-01 RX ORDER — BENZONATATE 100 MG/1
100 CAPSULE ORAL 3 TIMES DAILY
Qty: 20 CAPSULE | Refills: 0 | Status: SHIPPED | OUTPATIENT
Start: 2020-06-01

## 2020-06-01 RX ADMIN — PREDNISONE 40 MG: 20 TABLET ORAL at 08:38

## 2020-06-01 RX ADMIN — GUAIFENESIN AND DEXTROMETHORPHAN 10 ML: 100; 10 SYRUP ORAL at 00:31

## 2020-06-01 RX ADMIN — LEVALBUTEROL 1.25 MG: 1.25 SOLUTION, CONCENTRATE RESPIRATORY (INHALATION) at 07:50

## 2020-06-01 RX ADMIN — NICOTINE 1 PATCH: 7 PATCH TRANSDERMAL at 08:38

## 2020-06-01 RX ADMIN — BICTEGRAVIR SODIUM, EMTRICITABINE, AND TENOFOVIR ALAFENAMIDE FUMARATE 1 TABLET: 50; 200; 25 TABLET ORAL at 08:38

## 2020-06-01 RX ADMIN — GUAIFENESIN AND DEXTROMETHORPHAN 10 ML: 100; 10 SYRUP ORAL at 06:15

## 2020-06-01 RX ADMIN — HEPARIN SODIUM 5000 UNITS: 5000 INJECTION INTRAVENOUS; SUBCUTANEOUS at 06:14

## 2020-06-01 RX ADMIN — BUDESONIDE AND FORMOTEROL FUMARATE DIHYDRATE 2 PUFF: 160; 4.5 AEROSOL RESPIRATORY (INHALATION) at 08:38

## 2020-06-01 RX ADMIN — IPRATROPIUM BROMIDE 0.5 MG: 0.5 SOLUTION RESPIRATORY (INHALATION) at 07:50

## 2020-06-01 RX ADMIN — OXYCODONE HYDROCHLORIDE 5 MG: 5 TABLET ORAL at 06:18

## 2020-06-02 LAB — MRSA NOSE QL CULT: NORMAL

## 2020-06-04 LAB
BACTERIA BLD CULT: NORMAL
BACTERIA BLD CULT: NORMAL

## 2020-12-08 ENCOUNTER — HOSPITAL ENCOUNTER (INPATIENT)
Facility: HOSPITAL | Age: 40
LOS: 4 days | Discharge: HOME | DRG: 141 | End: 2020-12-12
Attending: EMERGENCY MEDICINE | Admitting: INTERNAL MEDICINE
Payer: COMMERCIAL

## 2020-12-08 ENCOUNTER — APPOINTMENT (EMERGENCY)
Dept: CT IMAGING | Facility: HOSPITAL | Age: 40
DRG: 141 | End: 2020-12-08
Payer: COMMERCIAL

## 2020-12-08 ENCOUNTER — APPOINTMENT (EMERGENCY)
Dept: RADIOLOGY | Facility: HOSPITAL | Age: 40
DRG: 141 | End: 2020-12-08
Payer: COMMERCIAL

## 2020-12-08 DIAGNOSIS — R91.8 LUNG MASS: ICD-10-CM

## 2020-12-08 DIAGNOSIS — J18.9 PNEUMONIA DUE TO INFECTIOUS ORGANISM, UNSPECIFIED LATERALITY, UNSPECIFIED PART OF LUNG: ICD-10-CM

## 2020-12-08 DIAGNOSIS — J45.51 SEVERE PERSISTENT ASTHMA WITH EXACERBATION: ICD-10-CM

## 2020-12-08 DIAGNOSIS — J45.901 ACUTE ASTHMA EXACERBATION: Primary | ICD-10-CM

## 2020-12-08 LAB
BASOPHILS # BLD AUTO: 0.03 THOUSANDS/ΜL (ref 0–0.1)
BASOPHILS NFR BLD AUTO: 0 % (ref 0–1)
EOSINOPHIL # BLD AUTO: 0.02 THOUSAND/ΜL (ref 0–0.61)
EOSINOPHIL NFR BLD AUTO: 0 % (ref 0–6)
ERYTHROCYTE [DISTWIDTH] IN BLOOD BY AUTOMATED COUNT: 13.9 % (ref 11.6–15.1)
FLUAV RNA RESP QL NAA+PROBE: NEGATIVE
FLUBV RNA RESP QL NAA+PROBE: NEGATIVE
HCT VFR BLD AUTO: 47.7 % (ref 36.5–49.3)
HGB BLD-MCNC: 15.6 G/DL (ref 12–17)
IMM GRANULOCYTES # BLD AUTO: 0.03 THOUSAND/UL (ref 0–0.2)
IMM GRANULOCYTES NFR BLD AUTO: 0 % (ref 0–2)
LYMPHOCYTES # BLD AUTO: 1.78 THOUSANDS/ΜL (ref 0.6–4.47)
LYMPHOCYTES NFR BLD AUTO: 18 % (ref 14–44)
MCH RBC QN AUTO: 27.6 PG (ref 26.8–34.3)
MCHC RBC AUTO-ENTMCNC: 32.7 G/DL (ref 31.4–37.4)
MCV RBC AUTO: 84 FL (ref 82–98)
MONOCYTES # BLD AUTO: 1.54 THOUSAND/ΜL (ref 0.17–1.22)
MONOCYTES NFR BLD AUTO: 16 % (ref 4–12)
NEUTROPHILS # BLD AUTO: 6.41 THOUSANDS/ΜL (ref 1.85–7.62)
NEUTS SEG NFR BLD AUTO: 66 % (ref 43–75)
NRBC BLD AUTO-RTO: 0 /100 WBCS
PLATELET # BLD AUTO: 244 THOUSANDS/UL (ref 149–390)
PMV BLD AUTO: 9.6 FL (ref 8.9–12.7)
RBC # BLD AUTO: 5.65 MILLION/UL (ref 3.88–5.62)
RSV RNA RESP QL NAA+PROBE: NEGATIVE
SARS-COV-2 RNA RESP QL NAA+PROBE: NEGATIVE
WBC # BLD AUTO: 9.81 THOUSAND/UL (ref 4.31–10.16)

## 2020-12-08 PROCEDURE — G1004 CDSM NDSC: HCPCS

## 2020-12-08 PROCEDURE — 96375 TX/PRO/DX INJ NEW DRUG ADDON: CPT

## 2020-12-08 PROCEDURE — 94640 AIRWAY INHALATION TREATMENT: CPT

## 2020-12-08 PROCEDURE — 71045 X-RAY EXAM CHEST 1 VIEW: CPT

## 2020-12-08 PROCEDURE — 99285 EMERGENCY DEPT VISIT HI MDM: CPT | Performed by: EMERGENCY MEDICINE

## 2020-12-08 PROCEDURE — 96374 THER/PROPH/DIAG INJ IV PUSH: CPT

## 2020-12-08 PROCEDURE — 71250 CT THORAX DX C-: CPT

## 2020-12-08 PROCEDURE — 36415 COLL VENOUS BLD VENIPUNCTURE: CPT | Performed by: INTERNAL MEDICINE

## 2020-12-08 PROCEDURE — 94760 N-INVAS EAR/PLS OXIMETRY 1: CPT

## 2020-12-08 PROCEDURE — 0241U HB NFCT DS VIR RESP RNA 4 TRGT: CPT | Performed by: INTERNAL MEDICINE

## 2020-12-08 PROCEDURE — 85025 COMPLETE CBC W/AUTO DIFF WBC: CPT | Performed by: INTERNAL MEDICINE

## 2020-12-08 PROCEDURE — 99222 1ST HOSP IP/OBS MODERATE 55: CPT | Performed by: INTERNAL MEDICINE

## 2020-12-08 PROCEDURE — 99285 EMERGENCY DEPT VISIT HI MDM: CPT

## 2020-12-08 RX ORDER — CALCIUM CARBONATE 200(500)MG
1000 TABLET,CHEWABLE ORAL DAILY PRN
Status: DISCONTINUED | OUTPATIENT
Start: 2020-12-08 | End: 2020-12-12 | Stop reason: HOSPADM

## 2020-12-08 RX ORDER — METHYLPREDNISOLONE SODIUM SUCCINATE 125 MG/2ML
125 INJECTION, POWDER, LYOPHILIZED, FOR SOLUTION INTRAMUSCULAR; INTRAVENOUS ONCE
Status: COMPLETED | OUTPATIENT
Start: 2020-12-08 | End: 2020-12-08

## 2020-12-08 RX ORDER — METHYLPREDNISOLONE SODIUM SUCCINATE 40 MG/ML
40 INJECTION, POWDER, LYOPHILIZED, FOR SOLUTION INTRAMUSCULAR; INTRAVENOUS EVERY 12 HOURS SCHEDULED
Status: DISCONTINUED | OUTPATIENT
Start: 2020-12-08 | End: 2020-12-11

## 2020-12-08 RX ORDER — BENZONATATE 100 MG/1
100 CAPSULE ORAL 3 TIMES DAILY PRN
Status: DISCONTINUED | OUTPATIENT
Start: 2020-12-08 | End: 2020-12-09

## 2020-12-08 RX ORDER — SODIUM CHLORIDE FOR INHALATION 0.9 %
3 VIAL, NEBULIZER (ML) INHALATION ONCE
Status: COMPLETED | OUTPATIENT
Start: 2020-12-08 | End: 2020-12-08

## 2020-12-08 RX ORDER — BUDESONIDE AND FORMOTEROL FUMARATE DIHYDRATE 160; 4.5 UG/1; UG/1
2 AEROSOL RESPIRATORY (INHALATION)
Status: DISCONTINUED | OUTPATIENT
Start: 2020-12-08 | End: 2020-12-12 | Stop reason: HOSPADM

## 2020-12-08 RX ORDER — ALBUTEROL SULFATE 90 UG/1
2 AEROSOL, METERED RESPIRATORY (INHALATION) EVERY 4 HOURS PRN
Status: DISCONTINUED | OUTPATIENT
Start: 2020-12-08 | End: 2020-12-12 | Stop reason: HOSPADM

## 2020-12-08 RX ORDER — LOSARTAN POTASSIUM 50 MG/1
50 TABLET ORAL DAILY
Status: DISCONTINUED | OUTPATIENT
Start: 2020-12-09 | End: 2020-12-12 | Stop reason: HOSPADM

## 2020-12-08 RX ORDER — MAGNESIUM SULFATE HEPTAHYDRATE 40 MG/ML
2 INJECTION, SOLUTION INTRAVENOUS ONCE
Status: COMPLETED | OUTPATIENT
Start: 2020-12-08 | End: 2020-12-08

## 2020-12-08 RX ORDER — IPRATROPIUM BROMIDE AND ALBUTEROL SULFATE 2.5; .5 MG/3ML; MG/3ML
3 SOLUTION RESPIRATORY (INHALATION)
Status: DISCONTINUED | OUTPATIENT
Start: 2020-12-08 | End: 2020-12-08

## 2020-12-08 RX ORDER — ACETAMINOPHEN 325 MG/1
650 TABLET ORAL EVERY 6 HOURS PRN
Status: DISCONTINUED | OUTPATIENT
Start: 2020-12-08 | End: 2020-12-12 | Stop reason: HOSPADM

## 2020-12-08 RX ORDER — IPRATROPIUM BROMIDE AND ALBUTEROL SULFATE 2.5; .5 MG/3ML; MG/3ML
3 SOLUTION RESPIRATORY (INHALATION)
Status: DISCONTINUED | OUTPATIENT
Start: 2020-12-08 | End: 2020-12-12 | Stop reason: HOSPADM

## 2020-12-08 RX ADMIN — IPRATROPIUM BROMIDE AND ALBUTEROL SULFATE 3 ML: 2.5; .5 SOLUTION RESPIRATORY (INHALATION) at 17:12

## 2020-12-08 RX ADMIN — BUDESONIDE AND FORMOTEROL FUMARATE DIHYDRATE 2 PUFF: 160; 4.5 AEROSOL RESPIRATORY (INHALATION) at 21:25

## 2020-12-08 RX ADMIN — MAGNESIUM SULFATE HEPTAHYDRATE 2 G: 40 INJECTION, SOLUTION INTRAVENOUS at 17:16

## 2020-12-08 RX ADMIN — ISODIUM CHLORIDE 3 ML: 0.03 SOLUTION RESPIRATORY (INHALATION) at 14:34

## 2020-12-08 RX ADMIN — IPRATROPIUM BROMIDE AND ALBUTEROL SULFATE 3 ML: 2.5; .5 SOLUTION RESPIRATORY (INHALATION) at 20:01

## 2020-12-08 RX ADMIN — METHYLPREDNISOLONE SODIUM SUCCINATE 125 MG: 125 INJECTION, POWDER, FOR SOLUTION INTRAMUSCULAR; INTRAVENOUS at 14:32

## 2020-12-08 RX ADMIN — IPRATROPIUM BROMIDE 1 MG: 0.5 SOLUTION RESPIRATORY (INHALATION) at 14:34

## 2020-12-08 RX ADMIN — ALBUTEROL SULFATE 5 MG: 2.5 SOLUTION RESPIRATORY (INHALATION) at 14:34

## 2020-12-08 RX ADMIN — METHYLPREDNISOLONE SODIUM SUCCINATE 40 MG: 40 INJECTION, POWDER, FOR SOLUTION INTRAMUSCULAR; INTRAVENOUS at 21:25

## 2020-12-08 RX ADMIN — BENZONATATE 100 MG: 100 CAPSULE ORAL at 22:14

## 2020-12-09 PROBLEM — J18.9 PNEUMONIA: Status: ACTIVE | Noted: 2020-12-08

## 2020-12-09 LAB
ANION GAP SERPL CALCULATED.3IONS-SCNC: 12 MMOL/L (ref 4–13)
BUN SERPL-MCNC: 18 MG/DL (ref 5–25)
CALCIUM SERPL-MCNC: 9.5 MG/DL (ref 8.3–10.1)
CHLORIDE SERPL-SCNC: 100 MMOL/L (ref 100–108)
CO2 SERPL-SCNC: 23 MMOL/L (ref 21–32)
CREAT SERPL-MCNC: 1.13 MG/DL (ref 0.6–1.3)
ERYTHROCYTE [DISTWIDTH] IN BLOOD BY AUTOMATED COUNT: 13.9 % (ref 11.6–15.1)
GFR SERPL CREATININE-BSD FRML MDRD: 92 ML/MIN/1.73SQ M
GLUCOSE SERPL-MCNC: 181 MG/DL (ref 65–140)
HCT VFR BLD AUTO: 46.3 % (ref 36.5–49.3)
HGB BLD-MCNC: 15.2 G/DL (ref 12–17)
L PNEUMO1 AG UR QL IA.RAPID: NEGATIVE
MCH RBC QN AUTO: 27.5 PG (ref 26.8–34.3)
MCHC RBC AUTO-ENTMCNC: 32.8 G/DL (ref 31.4–37.4)
MCV RBC AUTO: 84 FL (ref 82–98)
PLATELET # BLD AUTO: 250 THOUSANDS/UL (ref 149–390)
PMV BLD AUTO: 9.8 FL (ref 8.9–12.7)
POTASSIUM SERPL-SCNC: 4.1 MMOL/L (ref 3.5–5.3)
PROCALCITONIN SERPL-MCNC: 7.42 NG/ML
RBC # BLD AUTO: 5.52 MILLION/UL (ref 3.88–5.62)
S PNEUM AG UR QL: NEGATIVE
SODIUM SERPL-SCNC: 135 MMOL/L (ref 136–145)
WBC # BLD AUTO: 5.82 THOUSAND/UL (ref 4.31–10.16)

## 2020-12-09 PROCEDURE — 87070 CULTURE OTHR SPECIMN AEROBIC: CPT | Performed by: NURSE PRACTITIONER

## 2020-12-09 PROCEDURE — 94640 AIRWAY INHALATION TREATMENT: CPT

## 2020-12-09 PROCEDURE — 87185 SC STD ENZYME DETCJ PER NZM: CPT | Performed by: NURSE PRACTITIONER

## 2020-12-09 PROCEDURE — 84145 PROCALCITONIN (PCT): CPT | Performed by: PHYSICIAN ASSISTANT

## 2020-12-09 PROCEDURE — 94760 N-INVAS EAR/PLS OXIMETRY 1: CPT

## 2020-12-09 PROCEDURE — 87205 SMEAR GRAM STAIN: CPT | Performed by: NURSE PRACTITIONER

## 2020-12-09 PROCEDURE — 99255 IP/OBS CONSLTJ NEW/EST HI 80: CPT | Performed by: INTERNAL MEDICINE

## 2020-12-09 PROCEDURE — 85027 COMPLETE CBC AUTOMATED: CPT | Performed by: PHYSICIAN ASSISTANT

## 2020-12-09 PROCEDURE — 87077 CULTURE AEROBIC IDENTIFY: CPT | Performed by: NURSE PRACTITIONER

## 2020-12-09 PROCEDURE — 99232 SBSQ HOSP IP/OBS MODERATE 35: CPT | Performed by: INTERNAL MEDICINE

## 2020-12-09 PROCEDURE — 80048 BASIC METABOLIC PNL TOTAL CA: CPT | Performed by: PHYSICIAN ASSISTANT

## 2020-12-09 PROCEDURE — 87184 SC STD DISK METHOD PER PLATE: CPT | Performed by: NURSE PRACTITIONER

## 2020-12-09 PROCEDURE — 87449 NOS EACH ORGANISM AG IA: CPT | Performed by: NURSE PRACTITIONER

## 2020-12-09 RX ORDER — BENZONATATE 100 MG/1
200 CAPSULE ORAL 3 TIMES DAILY
Status: DISCONTINUED | OUTPATIENT
Start: 2020-12-09 | End: 2020-12-12 | Stop reason: HOSPADM

## 2020-12-09 RX ORDER — GUAIFENESIN/DEXTROMETHORPHAN 100-10MG/5
10 SYRUP ORAL EVERY 4 HOURS PRN
Status: DISCONTINUED | OUTPATIENT
Start: 2020-12-09 | End: 2020-12-12 | Stop reason: HOSPADM

## 2020-12-09 RX ORDER — LEVOFLOXACIN 5 MG/ML
750 INJECTION, SOLUTION INTRAVENOUS EVERY 24 HOURS
Status: DISCONTINUED | OUTPATIENT
Start: 2020-12-09 | End: 2020-12-12 | Stop reason: HOSPADM

## 2020-12-09 RX ADMIN — BENZONATATE 200 MG: 100 CAPSULE ORAL at 16:59

## 2020-12-09 RX ADMIN — LEVOFLOXACIN 750 MG: 5 INJECTION, SOLUTION INTRAVENOUS at 12:18

## 2020-12-09 RX ADMIN — METHYLPREDNISOLONE SODIUM SUCCINATE 40 MG: 40 INJECTION, POWDER, FOR SOLUTION INTRAMUSCULAR; INTRAVENOUS at 22:22

## 2020-12-09 RX ADMIN — GUAIFENESIN AND DEXTROMETHORPHAN 10 ML: 100; 10 SYRUP ORAL at 17:03

## 2020-12-09 RX ADMIN — GUAIFENESIN AND DEXTROMETHORPHAN 10 ML: 100; 10 SYRUP ORAL at 22:21

## 2020-12-09 RX ADMIN — BENZONATATE 200 MG: 100 CAPSULE ORAL at 22:21

## 2020-12-09 RX ADMIN — IPRATROPIUM BROMIDE AND ALBUTEROL SULFATE 3 ML: 2.5; .5 SOLUTION RESPIRATORY (INHALATION) at 07:10

## 2020-12-09 RX ADMIN — BUDESONIDE AND FORMOTEROL FUMARATE DIHYDRATE 2 PUFF: 160; 4.5 AEROSOL RESPIRATORY (INHALATION) at 09:18

## 2020-12-09 RX ADMIN — BICTEGRAVIR SODIUM, EMTRICITABINE, AND TENOFOVIR ALAFENAMIDE FUMARATE 1 TABLET: 50; 200; 25 TABLET ORAL at 09:18

## 2020-12-09 RX ADMIN — GUAIFENESIN AND DEXTROMETHORPHAN 10 ML: 100; 10 SYRUP ORAL at 10:32

## 2020-12-09 RX ADMIN — LOSARTAN POTASSIUM 50 MG: 50 TABLET, FILM COATED ORAL at 09:18

## 2020-12-09 RX ADMIN — IPRATROPIUM BROMIDE AND ALBUTEROL SULFATE 3 ML: 2.5; .5 SOLUTION RESPIRATORY (INHALATION) at 19:47

## 2020-12-09 RX ADMIN — BUDESONIDE AND FORMOTEROL FUMARATE DIHYDRATE 2 PUFF: 160; 4.5 AEROSOL RESPIRATORY (INHALATION) at 22:22

## 2020-12-09 RX ADMIN — METHYLPREDNISOLONE SODIUM SUCCINATE 40 MG: 40 INJECTION, POWDER, FOR SOLUTION INTRAMUSCULAR; INTRAVENOUS at 09:18

## 2020-12-09 RX ADMIN — IPRATROPIUM BROMIDE AND ALBUTEROL SULFATE 3 ML: 2.5; .5 SOLUTION RESPIRATORY (INHALATION) at 12:59

## 2020-12-10 DIAGNOSIS — J18.9 PNEUMONIA DUE TO INFECTIOUS ORGANISM, UNSPECIFIED LATERALITY, UNSPECIFIED PART OF LUNG: Primary | ICD-10-CM

## 2020-12-10 DIAGNOSIS — Z72.0 TOBACCO ABUSE: ICD-10-CM

## 2020-12-10 LAB
ANION GAP SERPL CALCULATED.3IONS-SCNC: 12 MMOL/L (ref 4–13)
BASOPHILS # BLD AUTO: 0.01 THOUSANDS/ΜL (ref 0–0.1)
BASOPHILS NFR BLD AUTO: 0 % (ref 0–1)
BUN SERPL-MCNC: 18 MG/DL (ref 5–25)
CALCIUM SERPL-MCNC: 9.1 MG/DL (ref 8.3–10.1)
CHLORIDE SERPL-SCNC: 104 MMOL/L (ref 100–108)
CO2 SERPL-SCNC: 23 MMOL/L (ref 21–32)
CREAT SERPL-MCNC: 1.19 MG/DL (ref 0.6–1.3)
EOSINOPHIL # BLD AUTO: 0 THOUSAND/ΜL (ref 0–0.61)
EOSINOPHIL NFR BLD AUTO: 0 % (ref 0–6)
ERYTHROCYTE [DISTWIDTH] IN BLOOD BY AUTOMATED COUNT: 14.2 % (ref 11.6–15.1)
GFR SERPL CREATININE-BSD FRML MDRD: 87 ML/MIN/1.73SQ M
GLUCOSE SERPL-MCNC: 162 MG/DL (ref 65–140)
HCT VFR BLD AUTO: 44.6 % (ref 36.5–49.3)
HGB BLD-MCNC: 14.5 G/DL (ref 12–17)
IMM GRANULOCYTES # BLD AUTO: 0.07 THOUSAND/UL (ref 0–0.2)
IMM GRANULOCYTES NFR BLD AUTO: 1 % (ref 0–2)
LYMPHOCYTES # BLD AUTO: 1.1 THOUSANDS/ΜL (ref 0.6–4.47)
LYMPHOCYTES NFR BLD AUTO: 9 % (ref 14–44)
MCH RBC QN AUTO: 27.8 PG (ref 26.8–34.3)
MCHC RBC AUTO-ENTMCNC: 32.5 G/DL (ref 31.4–37.4)
MCV RBC AUTO: 85 FL (ref 82–98)
MONOCYTES # BLD AUTO: 0.35 THOUSAND/ΜL (ref 0.17–1.22)
MONOCYTES NFR BLD AUTO: 3 % (ref 4–12)
NEUTROPHILS # BLD AUTO: 11.12 THOUSANDS/ΜL (ref 1.85–7.62)
NEUTS SEG NFR BLD AUTO: 87 % (ref 43–75)
NRBC BLD AUTO-RTO: 0 /100 WBCS
PLATELET # BLD AUTO: 264 THOUSANDS/UL (ref 149–390)
PMV BLD AUTO: 9.8 FL (ref 8.9–12.7)
POTASSIUM SERPL-SCNC: 4.5 MMOL/L (ref 3.5–5.3)
PROCALCITONIN SERPL-MCNC: 0.05 NG/ML
RBC # BLD AUTO: 5.22 MILLION/UL (ref 3.88–5.62)
SODIUM SERPL-SCNC: 139 MMOL/L (ref 136–145)
WBC # BLD AUTO: 12.65 THOUSAND/UL (ref 4.31–10.16)

## 2020-12-10 PROCEDURE — 99232 SBSQ HOSP IP/OBS MODERATE 35: CPT | Performed by: INTERNAL MEDICINE

## 2020-12-10 PROCEDURE — 84145 PROCALCITONIN (PCT): CPT | Performed by: PHYSICIAN ASSISTANT

## 2020-12-10 PROCEDURE — 80048 BASIC METABOLIC PNL TOTAL CA: CPT | Performed by: INTERNAL MEDICINE

## 2020-12-10 PROCEDURE — 85025 COMPLETE CBC W/AUTO DIFF WBC: CPT | Performed by: INTERNAL MEDICINE

## 2020-12-10 PROCEDURE — 94760 N-INVAS EAR/PLS OXIMETRY 1: CPT

## 2020-12-10 PROCEDURE — 94640 AIRWAY INHALATION TREATMENT: CPT

## 2020-12-10 RX ADMIN — NICOTINE 7 MG/24 HR DAILY TRANSDERMAL PATCH 1 PATCH: at 08:14

## 2020-12-10 RX ADMIN — BENZONATATE 200 MG: 100 CAPSULE ORAL at 08:12

## 2020-12-10 RX ADMIN — METHYLPREDNISOLONE SODIUM SUCCINATE 40 MG: 40 INJECTION, POWDER, FOR SOLUTION INTRAMUSCULAR; INTRAVENOUS at 20:51

## 2020-12-10 RX ADMIN — IPRATROPIUM BROMIDE AND ALBUTEROL SULFATE 3 ML: 2.5; .5 SOLUTION RESPIRATORY (INHALATION) at 08:02

## 2020-12-10 RX ADMIN — IPRATROPIUM BROMIDE AND ALBUTEROL SULFATE 3 ML: 2.5; .5 SOLUTION RESPIRATORY (INHALATION) at 18:40

## 2020-12-10 RX ADMIN — BUDESONIDE AND FORMOTEROL FUMARATE DIHYDRATE 2 PUFF: 160; 4.5 AEROSOL RESPIRATORY (INHALATION) at 20:20

## 2020-12-10 RX ADMIN — GUAIFENESIN AND DEXTROMETHORPHAN 10 ML: 100; 10 SYRUP ORAL at 20:54

## 2020-12-10 RX ADMIN — BENZONATATE 200 MG: 100 CAPSULE ORAL at 20:50

## 2020-12-10 RX ADMIN — BUDESONIDE AND FORMOTEROL FUMARATE DIHYDRATE 2 PUFF: 160; 4.5 AEROSOL RESPIRATORY (INHALATION) at 08:12

## 2020-12-10 RX ADMIN — BICTEGRAVIR SODIUM, EMTRICITABINE, AND TENOFOVIR ALAFENAMIDE FUMARATE 1 TABLET: 50; 200; 25 TABLET ORAL at 08:14

## 2020-12-10 RX ADMIN — METHYLPREDNISOLONE SODIUM SUCCINATE 40 MG: 40 INJECTION, POWDER, FOR SOLUTION INTRAMUSCULAR; INTRAVENOUS at 08:14

## 2020-12-10 RX ADMIN — IPRATROPIUM BROMIDE AND ALBUTEROL SULFATE 3 ML: 2.5; .5 SOLUTION RESPIRATORY (INHALATION) at 13:09

## 2020-12-10 RX ADMIN — LOSARTAN POTASSIUM 50 MG: 50 TABLET, FILM COATED ORAL at 08:13

## 2020-12-10 RX ADMIN — BENZONATATE 200 MG: 100 CAPSULE ORAL at 17:37

## 2020-12-10 RX ADMIN — LEVOFLOXACIN 750 MG: 5 INJECTION, SOLUTION INTRAVENOUS at 12:20

## 2020-12-10 RX ADMIN — GUAIFENESIN AND DEXTROMETHORPHAN 10 ML: 100; 10 SYRUP ORAL at 08:13

## 2020-12-11 LAB
ANION GAP SERPL CALCULATED.3IONS-SCNC: 9 MMOL/L (ref 4–13)
BASOPHILS # BLD AUTO: 0.01 THOUSANDS/ΜL (ref 0–0.1)
BASOPHILS NFR BLD AUTO: 0 % (ref 0–1)
BUN SERPL-MCNC: 15 MG/DL (ref 5–25)
CALCIUM SERPL-MCNC: 9.6 MG/DL (ref 8.3–10.1)
CHLORIDE SERPL-SCNC: 104 MMOL/L (ref 100–108)
CO2 SERPL-SCNC: 25 MMOL/L (ref 21–32)
CREAT SERPL-MCNC: 1.08 MG/DL (ref 0.6–1.3)
EOSINOPHIL # BLD AUTO: 0 THOUSAND/ΜL (ref 0–0.61)
EOSINOPHIL NFR BLD AUTO: 0 % (ref 0–6)
ERYTHROCYTE [DISTWIDTH] IN BLOOD BY AUTOMATED COUNT: 14.4 % (ref 11.6–15.1)
GFR SERPL CREATININE-BSD FRML MDRD: 97 ML/MIN/1.73SQ M
GLUCOSE SERPL-MCNC: 155 MG/DL (ref 65–140)
HCT VFR BLD AUTO: 45.2 % (ref 36.5–49.3)
HGB BLD-MCNC: 14.4 G/DL (ref 12–17)
IMM GRANULOCYTES # BLD AUTO: 0.09 THOUSAND/UL (ref 0–0.2)
IMM GRANULOCYTES NFR BLD AUTO: 1 % (ref 0–2)
LYMPHOCYTES # BLD AUTO: 1.47 THOUSANDS/ΜL (ref 0.6–4.47)
LYMPHOCYTES NFR BLD AUTO: 12 % (ref 14–44)
MCH RBC QN AUTO: 27.4 PG (ref 26.8–34.3)
MCHC RBC AUTO-ENTMCNC: 31.9 G/DL (ref 31.4–37.4)
MCV RBC AUTO: 86 FL (ref 82–98)
MONOCYTES # BLD AUTO: 0.4 THOUSAND/ΜL (ref 0.17–1.22)
MONOCYTES NFR BLD AUTO: 3 % (ref 4–12)
NEUTROPHILS # BLD AUTO: 9.94 THOUSANDS/ΜL (ref 1.85–7.62)
NEUTS SEG NFR BLD AUTO: 84 % (ref 43–75)
NRBC BLD AUTO-RTO: 0 /100 WBCS
PLATELET # BLD AUTO: 270 THOUSANDS/UL (ref 149–390)
PMV BLD AUTO: 9.2 FL (ref 8.9–12.7)
POTASSIUM SERPL-SCNC: 4.7 MMOL/L (ref 3.5–5.3)
RBC # BLD AUTO: 5.26 MILLION/UL (ref 3.88–5.62)
SODIUM SERPL-SCNC: 138 MMOL/L (ref 136–145)
WBC # BLD AUTO: 11.91 THOUSAND/UL (ref 4.31–10.16)

## 2020-12-11 PROCEDURE — 99232 SBSQ HOSP IP/OBS MODERATE 35: CPT | Performed by: INTERNAL MEDICINE

## 2020-12-11 PROCEDURE — 94150 VITAL CAPACITY TEST: CPT

## 2020-12-11 PROCEDURE — 94640 AIRWAY INHALATION TREATMENT: CPT

## 2020-12-11 PROCEDURE — 94760 N-INVAS EAR/PLS OXIMETRY 1: CPT

## 2020-12-11 PROCEDURE — 85025 COMPLETE CBC W/AUTO DIFF WBC: CPT | Performed by: INTERNAL MEDICINE

## 2020-12-11 PROCEDURE — 80048 BASIC METABOLIC PNL TOTAL CA: CPT | Performed by: INTERNAL MEDICINE

## 2020-12-11 RX ORDER — METHYLPREDNISOLONE SODIUM SUCCINATE 40 MG/ML
40 INJECTION, POWDER, LYOPHILIZED, FOR SOLUTION INTRAMUSCULAR; INTRAVENOUS ONCE
Status: COMPLETED | OUTPATIENT
Start: 2020-12-11 | End: 2020-12-11

## 2020-12-11 RX ORDER — PREDNISONE 20 MG/1
40 TABLET ORAL DAILY
Status: DISCONTINUED | OUTPATIENT
Start: 2020-12-12 | End: 2020-12-12 | Stop reason: HOSPADM

## 2020-12-11 RX ADMIN — BICTEGRAVIR SODIUM, EMTRICITABINE, AND TENOFOVIR ALAFENAMIDE FUMARATE 1 TABLET: 50; 200; 25 TABLET ORAL at 08:34

## 2020-12-11 RX ADMIN — GUAIFENESIN AND DEXTROMETHORPHAN 10 ML: 100; 10 SYRUP ORAL at 01:02

## 2020-12-11 RX ADMIN — BENZONATATE 200 MG: 100 CAPSULE ORAL at 08:33

## 2020-12-11 RX ADMIN — BUDESONIDE AND FORMOTEROL FUMARATE DIHYDRATE 2 PUFF: 160; 4.5 AEROSOL RESPIRATORY (INHALATION) at 08:35

## 2020-12-11 RX ADMIN — ALBUTEROL SULFATE 2 PUFF: 90 AEROSOL, METERED RESPIRATORY (INHALATION) at 18:06

## 2020-12-11 RX ADMIN — IPRATROPIUM BROMIDE AND ALBUTEROL SULFATE 3 ML: 2.5; .5 SOLUTION RESPIRATORY (INHALATION) at 08:07

## 2020-12-11 RX ADMIN — BUDESONIDE AND FORMOTEROL FUMARATE DIHYDRATE 2 PUFF: 160; 4.5 AEROSOL RESPIRATORY (INHALATION) at 22:03

## 2020-12-11 RX ADMIN — NICOTINE 7 MG/24 HR DAILY TRANSDERMAL PATCH 1 PATCH: at 08:31

## 2020-12-11 RX ADMIN — GUAIFENESIN AND DEXTROMETHORPHAN 10 ML: 100; 10 SYRUP ORAL at 08:33

## 2020-12-11 RX ADMIN — BENZONATATE 200 MG: 100 CAPSULE ORAL at 22:03

## 2020-12-11 RX ADMIN — IPRATROPIUM BROMIDE AND ALBUTEROL SULFATE 3 ML: 2.5; .5 SOLUTION RESPIRATORY (INHALATION) at 01:28

## 2020-12-11 RX ADMIN — LEVOFLOXACIN 750 MG: 5 INJECTION, SOLUTION INTRAVENOUS at 10:53

## 2020-12-11 RX ADMIN — GUAIFENESIN AND DEXTROMETHORPHAN 10 ML: 100; 10 SYRUP ORAL at 22:13

## 2020-12-11 RX ADMIN — IPRATROPIUM BROMIDE AND ALBUTEROL SULFATE 3 ML: 2.5; .5 SOLUTION RESPIRATORY (INHALATION) at 20:58

## 2020-12-11 RX ADMIN — BENZONATATE 200 MG: 100 CAPSULE ORAL at 15:29

## 2020-12-11 RX ADMIN — METHYLPREDNISOLONE SODIUM SUCCINATE 40 MG: 40 INJECTION, POWDER, FOR SOLUTION INTRAMUSCULAR; INTRAVENOUS at 22:03

## 2020-12-11 RX ADMIN — METHYLPREDNISOLONE SODIUM SUCCINATE 40 MG: 40 INJECTION, POWDER, FOR SOLUTION INTRAMUSCULAR; INTRAVENOUS at 08:34

## 2020-12-11 RX ADMIN — LOSARTAN POTASSIUM 50 MG: 50 TABLET, FILM COATED ORAL at 08:33

## 2020-12-11 RX ADMIN — IPRATROPIUM BROMIDE AND ALBUTEROL SULFATE 3 ML: 2.5; .5 SOLUTION RESPIRATORY (INHALATION) at 13:19

## 2020-12-12 VITALS
TEMPERATURE: 96.9 F | OXYGEN SATURATION: 98 % | SYSTOLIC BLOOD PRESSURE: 127 MMHG | RESPIRATION RATE: 18 BRPM | DIASTOLIC BLOOD PRESSURE: 74 MMHG | HEART RATE: 88 BPM

## 2020-12-12 LAB
ANION GAP SERPL CALCULATED.3IONS-SCNC: 9 MMOL/L (ref 4–13)
BACTERIA SPT RESP CULT: ABNORMAL
BACTERIA SPT RESP CULT: ABNORMAL
BASOPHILS # BLD AUTO: 0.02 THOUSANDS/ΜL (ref 0–0.1)
BASOPHILS NFR BLD AUTO: 0 % (ref 0–1)
BUN SERPL-MCNC: 18 MG/DL (ref 5–25)
CALCIUM SERPL-MCNC: 9.6 MG/DL (ref 8.3–10.1)
CHLORIDE SERPL-SCNC: 103 MMOL/L (ref 100–108)
CO2 SERPL-SCNC: 25 MMOL/L (ref 21–32)
CREAT SERPL-MCNC: 1.18 MG/DL (ref 0.6–1.3)
EOSINOPHIL # BLD AUTO: 0 THOUSAND/ΜL (ref 0–0.61)
EOSINOPHIL NFR BLD AUTO: 0 % (ref 0–6)
ERYTHROCYTE [DISTWIDTH] IN BLOOD BY AUTOMATED COUNT: 14.3 % (ref 11.6–15.1)
GFR SERPL CREATININE-BSD FRML MDRD: 87 ML/MIN/1.73SQ M
GLUCOSE SERPL-MCNC: 178 MG/DL (ref 65–140)
GRAM STN SPEC: ABNORMAL
HCT VFR BLD AUTO: 47 % (ref 36.5–49.3)
HGB BLD-MCNC: 15.1 G/DL (ref 12–17)
IMM GRANULOCYTES # BLD AUTO: 0.15 THOUSAND/UL (ref 0–0.2)
IMM GRANULOCYTES NFR BLD AUTO: 1 % (ref 0–2)
LYMPHOCYTES # BLD AUTO: 1.35 THOUSANDS/ΜL (ref 0.6–4.47)
LYMPHOCYTES NFR BLD AUTO: 12 % (ref 14–44)
MCH RBC QN AUTO: 27.4 PG (ref 26.8–34.3)
MCHC RBC AUTO-ENTMCNC: 32.1 G/DL (ref 31.4–37.4)
MCV RBC AUTO: 85 FL (ref 82–98)
MONOCYTES # BLD AUTO: 0.36 THOUSAND/ΜL (ref 0.17–1.22)
MONOCYTES NFR BLD AUTO: 3 % (ref 4–12)
NEUTROPHILS # BLD AUTO: 8.99 THOUSANDS/ΜL (ref 1.85–7.62)
NEUTS SEG NFR BLD AUTO: 84 % (ref 43–75)
NRBC BLD AUTO-RTO: 0 /100 WBCS
PLATELET # BLD AUTO: 297 THOUSANDS/UL (ref 149–390)
PMV BLD AUTO: 9.3 FL (ref 8.9–12.7)
POTASSIUM SERPL-SCNC: 4.6 MMOL/L (ref 3.5–5.3)
RBC # BLD AUTO: 5.52 MILLION/UL (ref 3.88–5.62)
SODIUM SERPL-SCNC: 137 MMOL/L (ref 136–145)
WBC # BLD AUTO: 10.87 THOUSAND/UL (ref 4.31–10.16)

## 2020-12-12 PROCEDURE — 94640 AIRWAY INHALATION TREATMENT: CPT

## 2020-12-12 PROCEDURE — 86361 T CELL ABSOLUTE COUNT: CPT | Performed by: INTERNAL MEDICINE

## 2020-12-12 PROCEDURE — 99239 HOSP IP/OBS DSCHRG MGMT >30: CPT | Performed by: INTERNAL MEDICINE

## 2020-12-12 PROCEDURE — 94760 N-INVAS EAR/PLS OXIMETRY 1: CPT

## 2020-12-12 PROCEDURE — 85025 COMPLETE CBC W/AUTO DIFF WBC: CPT | Performed by: INTERNAL MEDICINE

## 2020-12-12 PROCEDURE — 80048 BASIC METABOLIC PNL TOTAL CA: CPT | Performed by: INTERNAL MEDICINE

## 2020-12-12 RX ORDER — LEVOFLOXACIN 750 MG/1
750 TABLET ORAL EVERY 24 HOURS
Qty: 3 TABLET | Refills: 0 | Status: SHIPPED | OUTPATIENT
Start: 2020-12-13 | End: 2020-12-16

## 2020-12-12 RX ORDER — PREDNISONE 10 MG/1
TABLET ORAL
Qty: 30 TABLET | Refills: 0 | Status: SHIPPED | OUTPATIENT
Start: 2020-12-12

## 2020-12-12 RX ADMIN — BICTEGRAVIR SODIUM, EMTRICITABINE, AND TENOFOVIR ALAFENAMIDE FUMARATE 1 TABLET: 50; 200; 25 TABLET ORAL at 09:33

## 2020-12-12 RX ADMIN — LOSARTAN POTASSIUM 50 MG: 50 TABLET, FILM COATED ORAL at 09:33

## 2020-12-12 RX ADMIN — NICOTINE 7 MG/24 HR DAILY TRANSDERMAL PATCH 1 PATCH: at 09:34

## 2020-12-12 RX ADMIN — PREDNISONE 40 MG: 20 TABLET ORAL at 09:34

## 2020-12-12 RX ADMIN — BUDESONIDE AND FORMOTEROL FUMARATE DIHYDRATE 2 PUFF: 160; 4.5 AEROSOL RESPIRATORY (INHALATION) at 09:33

## 2020-12-12 RX ADMIN — BENZONATATE 200 MG: 100 CAPSULE ORAL at 09:34

## 2020-12-12 RX ADMIN — IPRATROPIUM BROMIDE AND ALBUTEROL SULFATE 3 ML: 2.5; .5 SOLUTION RESPIRATORY (INHALATION) at 07:17

## 2020-12-12 RX ADMIN — LEVOFLOXACIN 750 MG: 5 INJECTION, SOLUTION INTRAVENOUS at 10:28

## 2020-12-13 LAB
BASOPHILS # BLD AUTO: 0 X10E3/UL (ref 0–0.2)
BASOPHILS NFR BLD AUTO: 0 %
CD3+CD4+ CELLS # BLD: 358 /UL (ref 359–1519)
CD3+CD4+ CELLS NFR BLD: 25.6 % (ref 30.8–58.5)
EOSINOPHIL # BLD AUTO: 0 X10E3/UL (ref 0–0.4)
EOSINOPHIL NFR BLD AUTO: 0 %
ERYTHROCYTE [DISTWIDTH] IN BLOOD BY AUTOMATED COUNT: 13.7 % (ref 11.6–15.4)
HCT VFR BLD AUTO: 47.1 % (ref 37.5–51)
HGB BLD-MCNC: 15.3 G/DL (ref 13–17.7)
IMM GRANULOCYTES # BLD: 0.1 X10E3/UL (ref 0–0.1)
IMM GRANULOCYTES NFR BLD: 1 %
LYMPHOCYTES # BLD AUTO: 1.4 X10E3/UL (ref 0.7–3.1)
LYMPHOCYTES NFR BLD AUTO: 13 %
MCH RBC QN AUTO: 27.8 PG (ref 26.6–33)
MCHC RBC AUTO-ENTMCNC: 32.5 G/DL (ref 31.5–35.7)
MCV RBC AUTO: 86 FL (ref 79–97)
MONOCYTES # BLD AUTO: 0.3 X10E3/UL (ref 0.1–0.9)
MONOCYTES NFR BLD AUTO: 3 %
NEUTROPHILS # BLD AUTO: 8.9 X10E3/UL (ref 1.4–7)
NEUTROPHILS NFR BLD AUTO: 83 %
PLATELET # BLD AUTO: 291 X10E3/UL (ref 150–450)
RBC # BLD AUTO: 5.5 X10E6/UL (ref 4.14–5.8)
WBC # BLD AUTO: 10.9 X10E3/UL (ref 3.4–10.8)

## 2021-01-21 ENCOUNTER — OFFICE VISIT (OUTPATIENT)
Dept: PULMONOLOGY | Facility: CLINIC | Age: 41
End: 2021-01-21
Payer: COMMERCIAL

## 2021-01-21 ENCOUNTER — TELEPHONE (OUTPATIENT)
Dept: SLEEP CENTER | Facility: CLINIC | Age: 41
End: 2021-01-21

## 2021-01-21 VITALS
BODY MASS INDEX: 32.2 KG/M2 | HEART RATE: 83 BPM | RESPIRATION RATE: 18 BRPM | DIASTOLIC BLOOD PRESSURE: 90 MMHG | OXYGEN SATURATION: 96 % | WEIGHT: 230 LBS | TEMPERATURE: 97.8 F | HEIGHT: 71 IN | SYSTOLIC BLOOD PRESSURE: 140 MMHG

## 2021-01-21 DIAGNOSIS — I10 ESSENTIAL HYPERTENSION: ICD-10-CM

## 2021-01-21 DIAGNOSIS — F17.210 NICOTINE DEPENDENCE, CIGARETTES, UNCOMPLICATED: ICD-10-CM

## 2021-01-21 DIAGNOSIS — E66.09 CLASS 1 OBESITY DUE TO EXCESS CALORIES WITHOUT SERIOUS COMORBIDITY WITH BODY MASS INDEX (BMI) OF 32.0 TO 32.9 IN ADULT: ICD-10-CM

## 2021-01-21 DIAGNOSIS — G47.33 OSA (OBSTRUCTIVE SLEEP APNEA): ICD-10-CM

## 2021-01-21 DIAGNOSIS — J45.901 ACUTE ASTHMA EXACERBATION: ICD-10-CM

## 2021-01-21 DIAGNOSIS — J45.40 MODERATE PERSISTENT ASTHMA WITHOUT COMPLICATION: Primary | ICD-10-CM

## 2021-01-21 PROCEDURE — 99215 OFFICE O/P EST HI 40 MIN: CPT | Performed by: INTERNAL MEDICINE

## 2021-01-21 PROCEDURE — 99406 BEHAV CHNG SMOKING 3-10 MIN: CPT | Performed by: INTERNAL MEDICINE

## 2021-01-21 RX ORDER — BUDESONIDE AND FORMOTEROL FUMARATE DIHYDRATE 160; 4.5 UG/1; UG/1
2 AEROSOL RESPIRATORY (INHALATION) 2 TIMES DAILY
Qty: 1 INHALER | Refills: 5 | Status: SHIPPED | OUTPATIENT
Start: 2021-01-21 | End: 2022-02-24

## 2021-01-21 NOTE — PROGRESS NOTES
Office Progress Note - Pulmonary    Shivam Jones 41 y o  male MRN: [id number]    Encounter: [id number]      Assessment:   Bronchial asthma   Obstructive sleep apnea   Ongoing tobacco use   Essential hypertension   Obesity  Plan:     Diagnostic polysomnogram home study   Symbicort 160/4 5, 2 inhalations twice a day   Albuterol rescue inhaler 2 inhalations 4 times a day as needed   Smoking cessation   Follow-up in 3 months  Discussion:   The patient has bronchial asthma is fairly controlled  His morning cough is mainly due to his smoking  I had a long discussion with him regarding smoking and the potential complications if he continues to smoke  We talked about different strategies of how to quit smoking  The patient seems to be motivated to quit  I have maintained him on Symbicort 160/4 5, 2 inhalations twice a day  He will use the albuterol rescue inhaler 4 times a day as needed  He has clinical symptoms and signs of obstructive sleep apnea  I have ordered a diagnostic polysomnogram home study  I will see him in 3 months in a follow-up visit  Smoking cessation counseling 5 minutes  Subjective: The patient is here for a follow-up visit  He was seen at [facility] in consultation for bronchial asthma acute exacerbation  It has resolved  He is back to his baseline  He has morning cough and sputum production  The rest of the day fairly asymptomatic  He rarely needs to use his rescue inhaler at night  He is using Symbicort 160/4 5, 2 inhalations twice a day  Usually uses albuterol rescue inhaler in the morning  He still smoking about 8 cigarettes a day  Review of systems:  A 12 point system review is done and aside from what is stated above the rest of the review of systems is negative  Family history and social history are reviewed  Medications list is reviewed        Vitals: Blood pressure 140/90, pulse 83, temperature 97 8 °F (36 6 °C), resp  rate 18, height 5' 11" (1 803 m), weight 104 kg (230 lb), SpO2 96 %  ,     Physical Exam  Gen: Awake, alert, oriented x 3, no acute distress  HEENT: Mucous membranes moist, no oral lesions, no thrush  NECK: No accessory muscle use, JVP not elevated  Cardiac: Regular, single S1, single S2, no murmurs, no rubs, no gallops  Lungs:  Coarse breath sounds  No wheezing or rhonchi  Abdomen: normoactive bowel sounds, soft nontender, nondistended, no rebound or rigidity, no guarding  Extremities: no cyanosis, no clubbing, no edema  Neuro:  Grossly nonfocal   Skin:  No rash  Ref Range & Units 12/22/20 10:38 AM    Hemoglobin 12 5 - 17 0 g/dL 14 6    Hematocrit 37 0 - 48 0 % 43 1    WBC 4 0 - 10 5 thou/cmm 7 0    RBC 4 00 - 5 40 mill/cmm 5 09    Platelet Count 603 - 350 thou/cmm 230    MPV 7 5 - 11 3 fL 7 3Low     MCV 80 - 100 fL 85    MCH 27 0 - 36 0 pg 28 7    MCHC 32 0 - 37 0 g/dL 33 9    RDW 12 0 - 16 0 % 15 2    Differential Type   AUTO    Absolute Neutrophils 1 8 - 7 8 thou/cmm 4 5    Absolute Lymphocytes 1 0 - 3 0 thou/cmm 2 0    Absolute Monocytes 0 3 - 1 0 thou/cmm 0 4    Absolute Eosinophils 0 0 - 0 5 thou/cmm 0 1    Absolute Basophils 0 0 - 0 1 thou/cmm 0 0    Neutrophils  % 65    Lymphocytes  % 28    Monocytes  % 6    Eosinophils  % 1    Basophils  % 0     Ref Range & Units 12/22/20 10:38 AM    Glucose 65 - 99 mg/dL 69    BUN 7 - 28 mg/dL 19    Creatinine 0 53 - 1 30 mg/dL 1 09    Sodium 135 - 145 mmol/L 140    Potassium 3 5 - 5 2 mmol/L 4 4    Chloride 100 - 109 mmol/L 109    Carbon Dioxide 23 - 31 mmol/L 26    Calcium 8 5 - 10 1 mg/dL 9 1    Alkaline Phosphatase 35 - 120 U/L 84    Albumin 3 5 - 4 8 g/dL 3 6    Bilirubin, Total 0 2 - 1 0 mg/dL 0 5    Comment: Use of this assay is not recommended for patients undergoing treatment with eltrombopag due to the potential for falsely elevated results     Protein, Total 6 3 - 8 3 g/dL 6 9    AST <41 U/L 8    ALT <56 U/L 28    Anion Gap 3 - 11  4    GFR, Calculated >60 mL/min/1 73m2 83

## 2021-01-21 NOTE — TELEPHONE ENCOUNTER
Patients insurance doesn't cover a home sleep study, please place an order for a diagnostic sleep study  The code is an [employer], thank you!

## 2021-02-09 ENCOUNTER — TELEPHONE (OUTPATIENT)
Dept: SLEEP CENTER | Facility: CLINIC | Age: 41
End: 2021-02-09

## 2021-02-09 ENCOUNTER — TELEPHONE (OUTPATIENT)
Dept: PULMONOLOGY | Facility: CLINIC | Age: 41
End: 2021-02-09

## 2021-02-09 DIAGNOSIS — J45.40 MODERATE PERSISTENT ASTHMA WITHOUT COMPLICATION: Primary | ICD-10-CM

## 2021-02-09 RX ORDER — BUDESONIDE AND FORMOTEROL FUMARATE DIHYDRATE 160; 4.5 UG/1; UG/1
2 AEROSOL RESPIRATORY (INHALATION) 2 TIMES DAILY
Qty: 1 INHALER | Refills: 5 | Status: SHIPPED | OUTPATIENT
Start: 2021-02-09

## 2021-02-09 NOTE — TELEPHONE ENCOUNTER
----- Message from Elan Sanchez DO sent at 2/8/2021  6:11 PM EST -----  Study approved  Requesting physician responsible for follow up      ----- Message -----  From: Sondra Trejo  Sent: 2/5/2021   9:07 AM EST  To: Sleep Medicine [facility] Provider    This sleep study needs approval      If approved please sign and return to clerical pool  If denied please include reasons why  Also provide alternative testing if warranted  Please sign and return to clerical pool

## 2021-02-24 ENCOUNTER — TELEPHONE (OUTPATIENT)
Dept: PULMONOLOGY | Facility: CLINIC | Age: 41
End: 2021-02-24

## 2021-02-25 ENCOUNTER — TELEPHONE (OUTPATIENT)
Dept: PULMONOLOGY | Facility: CLINIC | Age: 41
End: 2021-02-25

## 2021-02-25 DIAGNOSIS — G47.33 OSA (OBSTRUCTIVE SLEEP APNEA): Primary | ICD-10-CM

## 2021-02-25 NOTE — TELEPHONE ENCOUNTER
Left message to patient explaining home study is not covered and needs an in lab study done  Was told to call our office so we can assist with this  Will cancel appt for 3/8/21  Dr Wero Kohler is aware a new order needs to be placed

## 2021-04-13 ENCOUNTER — HOSPITAL ENCOUNTER (EMERGENCY)
Facility: HOSPITAL | Age: 41
Discharge: HOME | End: 2021-04-13
Attending: EMERGENCY MEDICINE | Admitting: EMERGENCY MEDICINE
Payer: COMMERCIAL

## 2021-04-13 ENCOUNTER — APPOINTMENT (EMERGENCY)
Dept: RADIOLOGY | Facility: HOSPITAL | Age: 41
End: 2021-04-13
Payer: COMMERCIAL

## 2021-04-13 VITALS
WEIGHT: 227.51 LBS | BODY MASS INDEX: 31.73 KG/M2 | OXYGEN SATURATION: 97 % | RESPIRATION RATE: 18 BRPM | SYSTOLIC BLOOD PRESSURE: 170 MMHG | TEMPERATURE: 98.7 F | HEART RATE: 87 BPM | DIASTOLIC BLOOD PRESSURE: 78 MMHG

## 2021-04-13 DIAGNOSIS — Z20.822 ENCOUNTER FOR LABORATORY TESTING FOR COVID-19 VIRUS: Primary | ICD-10-CM

## 2021-04-13 DIAGNOSIS — J18.9 PNEUMONIA: ICD-10-CM

## 2021-04-13 PROCEDURE — U0005 INFEC AGEN DETEC AMPLI PROBE: HCPCS | Performed by: PHYSICIAN ASSISTANT

## 2021-04-13 PROCEDURE — 99285 EMERGENCY DEPT VISIT HI MDM: CPT | Performed by: PHYSICIAN ASSISTANT

## 2021-04-13 PROCEDURE — 99284 EMERGENCY DEPT VISIT MOD MDM: CPT

## 2021-04-13 PROCEDURE — 71045 X-RAY EXAM CHEST 1 VIEW: CPT

## 2021-04-13 PROCEDURE — U0003 INFECTIOUS AGENT DETECTION BY NUCLEIC ACID (DNA OR RNA); SEVERE ACUTE RESPIRATORY SYNDROME CORONAVIRUS 2 (SARS-COV-2) (CORONAVIRUS DISEASE [COVID-19]), AMPLIFIED PROBE TECHNIQUE, MAKING USE OF HIGH THROUGHPUT TECHNOLOGIES AS DESCRIBED BY CMS-2020-01-R: HCPCS | Performed by: PHYSICIAN ASSISTANT

## 2021-04-13 RX ORDER — ALBUTEROL SULFATE 2.5 MG/3ML
2.5 SOLUTION RESPIRATORY (INHALATION) EVERY 6 HOURS PRN
Qty: 75 ML | Refills: 0 | Status: SHIPPED | OUTPATIENT
Start: 2021-04-13

## 2021-04-13 RX ORDER — BENZONATATE 100 MG/1
100 CAPSULE ORAL 3 TIMES DAILY PRN
Qty: 20 CAPSULE | Refills: 0 | Status: SHIPPED | OUTPATIENT
Start: 2021-04-13 | End: 2021-04-20

## 2021-04-13 RX ORDER — LEVOFLOXACIN 750 MG/1
750 TABLET ORAL EVERY 24 HOURS
Qty: 7 TABLET | Refills: 0 | Status: SHIPPED | OUTPATIENT
Start: 2021-04-13 | End: 2021-04-20

## 2021-04-13 NOTE — ED PROVIDER NOTES
History  Chief Complaint   Patient presents with    Shortness of Breath     Pt reports SOB, cough, and body aches for 1 day  Pt reports his room mate has similiar symptoms     + SICK contacts - cough and body ahces and chills and some chest tightness/SOB - comes and goes  Had mild cough x4 days - worse since last night  HX HIV _ pt reports his viral load is undetectable and labs are good - taking his meds for it regularly  HX asthma - using his meds  No vomiting or diarrhea  No true fevers  Room mate w same symptoms  Pt states his friend was told he may have covid and pt was sent home from work and so he came in to get tested  Prior to Admission Medications   Prescriptions Last Dose Informant Patient Reported? Taking? albuterol (PROAIR HFA) 90 mcg/act inhaler  Self No No   Sig: Inhale 2 puffs every 4 (four) hours as needed for wheezing   benzonatate (TESSALON PERLES) 100 mg capsule  Self No No   Sig: Take 1 capsule (100 mg total) by mouth 3 (three) times a day   Patient not taking: Reported on 1/21/2021   bictegravir-emtricitab-tenofovir alafenamide (BIKTARVY) -25 MG tablet  Self No No   Sig: Take 1 tablet by mouth daily with breakfast   budesonide-formoterol (SYMBICORT) 160-4 5 mcg/act inhaler   No No   Sig: Inhale 2 puffs 2 (two) times a day   budesonide-formoterol (SYMBICORT) 160-4 5 mcg/act inhaler   No No   Sig: Inhale 2 puffs 2 (two) times a day Rinse mouth after use     losartan (COZAAR) 50 mg tablet  Self Yes No   Sig: Take 50 mg by mouth daily   nicotine (NICODERM CQ) 7 mg/24hr TD 24 hr patch  Self No No   Sig: Place 1 patch on the skin daily   predniSONE 10 mg tablet  Self No No   Sig: Take 4 tabs (40mg) x 3 days, 3 tabs (30mg) x 3 days, 2 tabs (20mg) x 3 days, 1 tab (10mg) x 3 days   Patient not taking: Reported on 1/21/2021      Facility-Administered Medications: None       Past Medical History:   Diagnosis Date    Asthma     [facility])        History reviewed  No pertinent surgical history  Family History   Problem Relation Age of Onset    Colon cancer Father     Esophageal cancer Other     Lung cancer Other      I have reviewed and agree with the history as documented  E-Cigarette/Vaping    E-Cigarette Use Never User      E-Cigarette/Vaping Substances    Nicotine No     THC No     CBD No     Flavoring No     Other No     Unknown No      Social History     Tobacco Use    Smoking status: Current Every Day Smoker     Packs/day: 2 00     Years: 26 00     Pack years: 52 00     Types: Cigarettes     Start date: 1994    Smokeless tobacco: Never Used    Tobacco comment: 7 PER DAY AS OF 1/21/21   Substance Use Topics    Alcohol use: No    Drug use: Not Currently     Types: Cocaine       Review of Systems   Respiratory: Positive for cough  Cardiovascular: Negative  Gastrointestinal: Negative  Genitourinary: Negative  Neurological: Negative for dizziness  All other systems reviewed and are negative  Physical Exam  Physical Exam  Vitals signs and nursing note reviewed  Constitutional:       Appearance: He is well-developed  HENT:      Head: Normocephalic and atraumatic  Right Ear: External ear normal       Left Ear: External ear normal    Eyes:      Conjunctiva/sclera: Conjunctivae normal    Neck:      Musculoskeletal: Normal range of motion and neck supple  Cardiovascular:      Rate and Rhythm: Normal rate and regular rhythm  Heart sounds: Normal heart sounds  Pulmonary:      Effort: Pulmonary effort is normal       Breath sounds: Normal breath sounds  Abdominal:      Palpations: Abdomen is soft  Musculoskeletal: Normal range of motion  Lymphadenopathy:      Cervical: No cervical adenopathy  Skin:     General: Skin is warm  Findings: No rash  Neurological:      Mental Status: He is alert and oriented to person, place, and time  Motor: No abnormal muscle tone  Coordination: Coordination normal    Psychiatric:         Behavior: Behavior normal          Vital Signs  ED Triage Vitals [04/13/21 1644]   Temperature Pulse Respirations Blood Pressure SpO2   98 7 °F (37 1 °C) 87 18 170/78 97 %      Temp Source Heart Rate Source Patient Position - Orthostatic VS BP Location FiO2 (%)   Oral Monitor -- -- --      Pain Score       No Pain           Vitals:    04/13/21 1644   BP: 170/78   Pulse: 87         Visual Acuity      ED Medications  Medications - No data to display    Diagnostic Studies  Results Reviewed     Procedure Component Value Units Date/Time    Novel Coronavirus Nathalie RODRIGUEZ [facility] [[id number]] Collected: 04/13/21 1737    Lab Status: In process Specimen: Nasopharyngeal Swab Updated: 04/13/21 1746                 XR chest 1 view portable   ED Interpretation by Odalys Ledezma PA-C (04/13 1750)   Possible infiltrate - will treat                 Procedures  Procedures         ED Course  ED Course as of Apr 13 2002   Tue Apr 13, 2021   1723 Ambulatory pulse ox  % on room air      1743 Had lenghty discussion w pt about his xray chest- pt has FU with [facility] - through his PCP for the lung - had recent cts showing resolution of the mass - was all pneumonia - he has persistent small nodules that are being watched -records reviewed                                               MDM  Number of Diagnoses or Management Options  Encounter for laboratory testing for COVID-19 virus: new and requires workup  Pneumonia: new and requires workup  Diagnosis management comments: Pt had been dx here w pneumonia and had been admitted prior CT showed lung mass - pt FU with his drs at [facility] and the mass resolved pt reports "they think it was all the infection"      HX HIV - pt states his viral load is undetected and his counts are good        Amount and/or Complexity of Data Reviewed  Review and summarize past medical records: yes  Independent visualization of images, tracings, or specimens: yes    Risk of Complications, Morbidity, and/or Mortality  General comments: Pt ambulating well in room no hypoxia, no wheezing, pt comfortable going home and taking meds at home - strict return precautions reviewed w pt  Pt to quarantine at home  Also discussed PCP FU for possible monoclonal antibodies if he is positive  Patient Progress  Patient progress: improved      Disposition  Final diagnoses:   Encounter for laboratory testing for COVID-19 virus   Pneumonia     Time reflects when diagnosis was documented in both MDM as applicable and the Disposition within this note     Time User Action Codes Description Comment    4/13/2021  5:24 PM Odalys Ledezma [J20 9] Acute bronchitis     4/13/2021  5:24 PM Odalys Ledezma [Z20 822] Encounter for laboratory testing for COVID-19 virus     4/13/2021  5:50 PM Odalys Ledezma [J18 9] Pneumonia     4/13/2021  5:54 PM Odalys Ledezma [Z20 822] Encounter for laboratory testing for COVID-19 virus     4/13/2021  5:54 PM Odalys Ledezma [J20 9] Acute bronchitis       ED Disposition     ED Disposition Condition Date/Time Comment    Discharge Stable Tue Apr 13, 2021  5:24 PM [facility] discharge to home/self care              Follow-up Information     Follow up With Specialties Details Why [id number] [facility], MD Family Medicine   [address] [place] [address] [address]  Jayce Baeza U  [address]  [id number]  [phone]            Discharge Medication List as of 4/13/2021  5:55 PM      START taking these medications    Details   albuterol (2 5 mg/3 mL) 0 083 % nebulizer solution Take 1 vial (2 5 mg total) by nebulization every 6 (six) hours as needed for wheezing or shortness of breath, Starting Tue 4/13/2021, Normal      !! benzonatate (TESSALON PERLES) 100 mg capsule Take 1 capsule (100 mg total) by mouth 3 (three) times a day as needed for cough for up to 7 days, Starting Tue 4/13/2021, Until Tue 4/20/2021, Normal      levofloxacin (LEVAQUIN) 750 mg tablet Take 1 tablet (750 mg total) by mouth every 24 hours for 7 days, Starting Tue 4/13/2021, Until Tue 4/20/2021, Normal       !! - Potential duplicate medications found  Please discuss with provider  CONTINUE these medications which have NOT CHANGED    Details   albuterol (PROAIR HFA) 90 mcg/act inhaler Inhale 2 puffs every 4 (four) hours as needed for wheezing, Starting Thu 12/20/2018, Print      !! benzonatate (TESSALON PERLES) 100 mg capsule Take 1 capsule (100 mg total) by mouth 3 (three) times a day, Starting Mon 6/1/2020, Normal      bictegravir-emtricitab-tenofovir alafenamide (BIKTARVY) -25 MG tablet Take 1 tablet by mouth daily with breakfast, Starting Mon 12/24/2018, No Print      !! budesonide-formoterol (SYMBICORT) 160-4 5 mcg/act inhaler Inhale 2 puffs 2 (two) times a day, Starting Thu 1/21/2021, Until Thu 2/24/2022, Normal      !! budesonide-formoterol (SYMBICORT) 160-4 5 mcg/act inhaler Inhale 2 puffs 2 (two) times a day Rinse mouth after use , Starting Tue 2/9/2021, Normal      losartan (COZAAR) 50 mg tablet Take 50 mg by mouth daily, Starting Thu 1/30/2020, Until Fri 1/29/2021, Historical Med      nicotine (NICODERM CQ) 7 mg/24hr TD 24 hr patch Place 1 patch on the skin daily, Starting Tue 12/25/2018, Print      predniSONE 10 mg tablet Take 4 tabs (40mg) x 3 days, 3 tabs (30mg) x 3 days, 2 tabs (20mg) x 3 days, 1 tab (10mg) x 3 days, Normal       !! - Potential duplicate medications found  Please discuss with provider  No discharge procedures on file      PDMP Review     None          ED Provider  Electronically Signed by           Mamadou Capone PA-C  04/13/21 2002

## 2021-04-13 NOTE — Clinical Note
Bayron Kidd was seen and treated in our emergency department on 4/13/2021  Diagnosis:     Nimo Renee    He may return on this date: You were tested for COVID today  It is important that you quarantine for your results  Anyone you have been in close contact with should also quarantine  If you have any questions or concerns, please don't hesitate to call        Odalys Ledezma PA-C    ______________________________           _______________          _______________  Hospital Representative                              Date                                Time

## 2021-04-14 LAB — SARS-COV-2 RNA RESP QL NAA+PROBE: POSITIVE

## 2021-04-14 NOTE — RESULT ENCOUNTER NOTE
Discussed results with pt - he had CT done at [facility] - and he understands to FU with his PCP for FU imagine

## 2021-04-14 NOTE — RESULT ENCOUNTER NOTE
Symptoms improving   Discussed xray and covid test   Also discussed monoclonal antibodies w pt and he will talk to his PCP

## 2023-01-01 NOTE — SEPSIS NOTE
Sepsis Note   Oh Jones 34 y o  male MRN: [id number]  Unit/Bed#: ED 09 Encounter: [id number]            Initial Sepsis Screening     [address] Name 12/22/18 1144                Is the patient's history suggestive of a new or worsening infection? (!)  Yes (Proceed)  -CR        Suspected source of infection pneumonia  -CR        Are two or more of the following signs & symptoms of infection both present and new to the patient? No  -CR        Indicate SIRS criteria Tachycardia > 90 bpm;Tachypnea > 20 resp per min  -CR        If the answer is yes to both questions, suspicion of sepsis is present          If severe sepsis is present AND tissue hypoperfusion perists in the hour after fluid resuscitation or lactate > 4, the patient meets criteria for SEPTIC SHOCK          Are any of the following organ dysfunction criteria present within 6 hours of suspected infection and SIRS criteria that are NOT considered to be chronic conditions? (!)  Yes  -CR        Organ dysfunction Lactate > 2 0 mmol/L  -CR        Date of presentation of severe sepsis          Time of presentation of severe sepsis          Tissue hypoperfusion persists in the hour after crystalloid fluid administration, evidenced, by either:          Was hypotension present within one hour of the conclusion of crystalloid fluid administration?           Date of presentation of septic shock          Time of presentation of septic shock            User Key  (r) = Recorded By, (t) = Taken By, (c) = Cosigned By    Initials Name Provider Type    MICHELLE Díaz, DO Physician normal (ped)...